# Patient Record
Sex: MALE | Race: WHITE | NOT HISPANIC OR LATINO | Employment: FULL TIME | URBAN - METROPOLITAN AREA
[De-identification: names, ages, dates, MRNs, and addresses within clinical notes are randomized per-mention and may not be internally consistent; named-entity substitution may affect disease eponyms.]

---

## 2017-10-20 ENCOUNTER — TRANSFERRED RECORDS (OUTPATIENT)
Dept: HEALTH INFORMATION MANAGEMENT | Facility: CLINIC | Age: 37
End: 2017-10-20

## 2019-03-20 ENCOUNTER — TRANSFERRED RECORDS (OUTPATIENT)
Dept: HEALTH INFORMATION MANAGEMENT | Facility: CLINIC | Age: 39
End: 2019-03-20

## 2019-05-24 ENCOUNTER — TRANSFERRED RECORDS (OUTPATIENT)
Dept: HEALTH INFORMATION MANAGEMENT | Facility: CLINIC | Age: 39
End: 2019-05-24

## 2020-11-05 ENCOUNTER — TELEPHONE (OUTPATIENT)
Dept: NEUROSURGERY | Facility: CLINIC | Age: 40
End: 2020-11-05
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
## 2020-11-05 NOTE — TELEPHONE ENCOUNTER
Health Call Center    Phone Message    May a detailed message be left on voicemail: yes     Reason for Call: Other: Sandro calling to return the missed call from Karoline. He stated that he is going to be back in [place] in mid December. He would like to schedule for anything after the 15th of December any time would be fine with him. Please call and leave a message of time and date.    Action Taken: Message routed to:  [facility] ([facility]): [facility] NEUROSURGERY    Travel Screening: Not Applicable

## 2020-11-06 ENCOUNTER — TELEPHONE (OUTPATIENT)
Dept: NEUROSURGERY | Facility: CLINIC | Age: 40
End: 2020-11-06

## 2020-11-06 NOTE — TELEPHONE ENCOUNTER
[facility] Call Center    Phone Message    May a detailed message be left on voicemail: yes     Reason for Call: Other: Please have Karoline call pt back. Thank you.     Action Taken: Message routed to:  [facility] ([facility]):  Neurosurgery Scheduling    Travel Screening: Not Applicable

## 2020-11-10 NOTE — TELEPHONE ENCOUNTER
[facility] Call Center    Phone Message    May a detailed message be left on voicemail: yes     Reason for Call: Other: Sandro returning call. Please call him back.      Action Taken: Message routed to:  [facility] ([facility]): [facility]    Travel Screening: Not Applicable

## 2020-11-11 NOTE — TELEPHONE ENCOUNTER
[facility] Call Center    Phone Message    May a detailed message be left on voicemail: yes     Reason for Call: Other: Albert calling due to a missed call. Please call him at your earliest convenience to discuss.     Action Taken: Message routed to:  [facility] ([facility]): [facility] NEUROSURGERY    Travel Screening: Not Applicable

## 2020-12-14 ENCOUNTER — OFFICE VISIT (OUTPATIENT)
Dept: NEUROSURGERY | Facility: CLINIC | Age: 40
End: 2020-12-14
Attending: NEUROLOGICAL SURGERY
Payer: MEDICAID

## 2020-12-14 ENCOUNTER — ANCILLARY PROCEDURE (OUTPATIENT)
Dept: GENERAL RADIOLOGY | Facility: CLINIC | Age: 40
End: 2020-12-14
Attending: NEUROLOGICAL SURGERY
Payer: MEDICAID

## 2020-12-14 VITALS
DIASTOLIC BLOOD PRESSURE: 91 MMHG | TEMPERATURE: 98.5 F | HEART RATE: 89 BPM | HEIGHT: 74 IN | WEIGHT: 221 LBS | BODY MASS INDEX: 28.36 KG/M2 | OXYGEN SATURATION: 96 % | SYSTOLIC BLOOD PRESSURE: 160 MMHG

## 2020-12-14 DIAGNOSIS — Z98.1 HX OF FUSION OF CERVICAL SPINE: ICD-10-CM

## 2020-12-14 DIAGNOSIS — S14.109A CERVICAL SPINAL CORD INJURY, INITIAL ENCOUNTER (H): ICD-10-CM

## 2020-12-14 DIAGNOSIS — M47.814 THORACIC SPONDYLOSIS: ICD-10-CM

## 2020-12-14 DIAGNOSIS — Z98.1 HISTORY OF THORACIC SPINAL FUSION: ICD-10-CM

## 2020-12-14 DIAGNOSIS — Z98.1 HX OF FUSION OF CERVICAL SPINE: Primary | ICD-10-CM

## 2020-12-14 PROCEDURE — 72082 X-RAY EXAM ENTIRE SPI 2/3 VW: CPT | Performed by: RADIOLOGY

## 2020-12-14 PROCEDURE — 99203 OFFICE O/P NEW LOW 30 MIN: CPT | Performed by: NEUROLOGICAL SURGERY

## 2020-12-14 RX ORDER — TRAMADOL HYDROCHLORIDE 50 MG/1
50 TABLET ORAL PRN
COMMUNITY
End: 2021-03-04

## 2020-12-14 RX ORDER — BACLOFEN 10 MG/1
10 TABLET ORAL 3 TIMES DAILY
COMMUNITY
End: 2021-01-29

## 2020-12-14 RX ORDER — PREGABALIN 200 MG/1
200 CAPSULE ORAL DAILY
COMMUNITY
End: 2021-02-12

## 2020-12-14 RX ORDER — DIAZEPAM 5 MG
5 TABLET ORAL PRN
COMMUNITY
End: 2021-01-29

## 2020-12-14 RX ORDER — METHOCARBAMOL 500 MG/1
500 TABLET, FILM COATED ORAL 3 TIMES DAILY
COMMUNITY
End: 2021-01-29

## 2020-12-14 RX ORDER — ESCITALOPRAM OXALATE 10 MG/1
10 TABLET ORAL DAILY
COMMUNITY
End: 2021-03-04

## 2020-12-14 RX ORDER — ACETAMINOPHEN 500 MG
1000 TABLET ORAL DAILY PRN
COMMUNITY
End: 2021-01-29

## 2020-12-14 RX ORDER — TRAMADOL HYDROCHLORIDE 100 MG/1
100 TABLET, EXTENDED RELEASE ORAL AT BEDTIME
COMMUNITY
Start: 2020-10-01 | End: 2021-01-29

## 2020-12-14 RX ORDER — PREGABALIN 300 MG/1
300 CAPSULE ORAL 2 TIMES DAILY
COMMUNITY
End: 2021-02-12

## 2020-12-14 RX ORDER — LOSARTAN POTASSIUM AND HYDROCHLOROTHIAZIDE 12.5; 5 MG/1; MG/1
1 TABLET ORAL DAILY
COMMUNITY
End: 2021-04-28

## 2020-12-14 ASSESSMENT — MIFFLIN-ST. JEOR: SCORE: 1972.2

## 2020-12-14 ASSESSMENT — PAIN SCALES - GENERAL: PAINLEVEL: NO PAIN (0)
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
## 2020-12-14 NOTE — PATIENT INSTRUCTIONS
Patient Next Steps:      Order placed for physical and occupational  Therapy and physiatry. You can call the phone number highlighted in the order to schedule your appointment. Please call our clinic if symptoms persist after your course of therapy.      Order placed for imaging. [place] X-ray today. Call to schedule your Thoracic MRI at your earliest convenience.You can call [facility] at [phone] . We will call you with the results and next steps once imaging is completed. Future orders placed for cervical and thoracic CT scans in 6-7 months. You can call same number to schedule.       Dr Draper would like to see you back in the clinic for follow up in 6-7  Month(s)with Cervical and thoracic CT scans done prior. Please call the number below to schedule.         Please call us if you have any further questions or concerns.    [facility]   Phone: [phone]  Fax: [phone]

## 2020-12-15 ENCOUNTER — THERAPY VISIT (OUTPATIENT)
Dept: PHYSICAL THERAPY | Facility: CLINIC | Age: 40
End: 2020-12-15
Attending: NEUROLOGICAL SURGERY
Payer: MEDICAID

## 2020-12-15 ENCOUNTER — TELEPHONE (OUTPATIENT)
Dept: NEUROSURGERY | Facility: CLINIC | Age: 40
End: 2020-12-15

## 2020-12-15 DIAGNOSIS — Z98.1 HX OF FUSION OF CERVICAL SPINE: ICD-10-CM

## 2020-12-15 DIAGNOSIS — M47.814 THORACIC SPONDYLOSIS: ICD-10-CM

## 2020-12-15 DIAGNOSIS — Z98.1 HISTORY OF THORACIC SPINAL FUSION: ICD-10-CM

## 2020-12-15 DIAGNOSIS — S14.109A CERVICAL SPINAL CORD INJURY, INITIAL ENCOUNTER (H): ICD-10-CM

## 2020-12-15 PROCEDURE — 97162 PT EVAL MOD COMPLEX 30 MIN: CPT | Mod: GP | Performed by: PHYSICAL THERAPIST

## 2020-12-15 PROCEDURE — 97110 THERAPEUTIC EXERCISES: CPT | Mod: GP | Performed by: PHYSICAL THERAPIST
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
## 2020-12-15 NOTE — TELEPHONE ENCOUNTER
Reason For Call: Patient is requesting that his OT referral be changed to state hand and arm therapy, OT would not let him schedule the appointment with the cervical spine and back diagnosis in the order, he can be reached at, [phone].

## 2020-12-16 ENCOUNTER — TELEPHONE (OUTPATIENT)
Dept: NEUROSURGERY | Facility: CLINIC | Age: 40
End: 2020-12-16

## 2020-12-16 ENCOUNTER — HOSPITAL ENCOUNTER (OUTPATIENT)
Dept: MRI IMAGING | Facility: CLINIC | Age: 40
Discharge: HOME | End: 2020-12-16
Attending: NEUROLOGICAL SURGERY | Admitting: NEUROLOGICAL SURGERY
Payer: MEDICAID

## 2020-12-16 DIAGNOSIS — Z98.1 HISTORY OF THORACIC SPINAL FUSION: ICD-10-CM

## 2020-12-16 DIAGNOSIS — S14.109A CERVICAL SPINAL CORD INJURY, INITIAL ENCOUNTER (H): ICD-10-CM

## 2020-12-16 DIAGNOSIS — M47.814 THORACIC SPONDYLOSIS: ICD-10-CM

## 2020-12-16 PROCEDURE — 72146 MRI CHEST SPINE W/O DYE: CPT

## 2020-12-16 NOTE — TELEPHONE ENCOUNTER
Pt needs a more specific OT referral in order for insurance coverage. Please adjust order and let pt know when it has been updated. Pt states CAROLYNN says he needs OT for Arm and Hand specifically.    Pt would like a call back when updated as the first OT therapy appt is scheduled for next Wednesday 12-23.

## 2020-12-17 ENCOUNTER — THERAPY VISIT (OUTPATIENT)
Dept: PHYSICAL THERAPY | Facility: CLINIC | Age: 40
End: 2020-12-17
Payer: MEDICAID

## 2020-12-17 DIAGNOSIS — R29.898 HAND WEAKNESS: Primary | ICD-10-CM

## 2020-12-17 DIAGNOSIS — Z98.1 HISTORY OF THORACIC SPINAL FUSION: ICD-10-CM

## 2020-12-17 DIAGNOSIS — Z98.1 HX OF FUSION OF CERVICAL SPINE: ICD-10-CM

## 2020-12-17 PROCEDURE — 97112 NEUROMUSCULAR REEDUCATION: CPT | Mod: GP | Performed by: PHYSICAL THERAPY ASSISTANT

## 2020-12-17 PROCEDURE — 97110 THERAPEUTIC EXERCISES: CPT | Mod: GP | Performed by: PHYSICAL THERAPY ASSISTANT

## 2020-12-22 ENCOUNTER — THERAPY VISIT (OUTPATIENT)
Dept: PHYSICAL THERAPY | Facility: CLINIC | Age: 40
End: 2020-12-22
Payer: MEDICAID

## 2020-12-22 DIAGNOSIS — Z98.1 HISTORY OF THORACIC SPINAL FUSION: ICD-10-CM

## 2020-12-22 DIAGNOSIS — Z98.1 HX OF FUSION OF CERVICAL SPINE: ICD-10-CM

## 2020-12-22 DIAGNOSIS — R29.898 HAND WEAKNESS: Primary | ICD-10-CM

## 2020-12-22 PROCEDURE — 97112 NEUROMUSCULAR REEDUCATION: CPT | Mod: GP | Performed by: PHYSICAL THERAPIST

## 2020-12-22 PROCEDURE — 97110 THERAPEUTIC EXERCISES: CPT | Mod: GP | Performed by: PHYSICAL THERAPIST

## 2020-12-22 NOTE — PROGRESS NOTES
CAROLYNN needed clarification on the OT orders.    Completed and OT is scheduling him from treatment.

## 2020-12-23 ENCOUNTER — THERAPY VISIT (OUTPATIENT)
Dept: OCCUPATIONAL THERAPY | Facility: CLINIC | Age: 40
End: 2020-12-23
Payer: MEDICAID

## 2020-12-23 DIAGNOSIS — R29.898 HAND WEAKNESS: ICD-10-CM

## 2020-12-23 DIAGNOSIS — R20.2 PARESTHESIA: ICD-10-CM

## 2020-12-23 PROCEDURE — 97165 OT EVAL LOW COMPLEX 30 MIN: CPT | Mod: GO | Performed by: OCCUPATIONAL THERAPIST

## 2020-12-23 PROCEDURE — 97112 NEUROMUSCULAR REEDUCATION: CPT | Mod: GO | Performed by: OCCUPATIONAL THERAPIST

## 2020-12-23 PROCEDURE — 97110 THERAPEUTIC EXERCISES: CPT | Mod: GO | Performed by: OCCUPATIONAL THERAPIST

## 2020-12-23 PROCEDURE — 97760 ORTHOTIC MGMT&TRAING 1ST ENC: CPT | Mod: GO | Performed by: OCCUPATIONAL THERAPIST
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
## 2020-12-24 ENCOUNTER — DOCUMENTATION ONLY (OUTPATIENT)
Dept: NEUROSURGERY | Facility: CLINIC | Age: 40
End: 2020-12-24

## 2020-12-24 ENCOUNTER — THERAPY VISIT (OUTPATIENT)
Dept: PHYSICAL THERAPY | Facility: CLINIC | Age: 40
End: 2020-12-24
Payer: MEDICAID

## 2020-12-24 DIAGNOSIS — Z98.1 HX OF FUSION OF CERVICAL SPINE: ICD-10-CM

## 2020-12-24 DIAGNOSIS — Z98.1 HISTORY OF THORACIC SPINAL FUSION: ICD-10-CM

## 2020-12-24 PROCEDURE — 97110 THERAPEUTIC EXERCISES: CPT | Mod: GP | Performed by: PHYSICAL THERAPY ASSISTANT

## 2020-12-24 NOTE — PROGRESS NOTES
Faxed Form December 24, 2020 to [facility] fax number [phone]    Right Fax confirmed at 10:10 AM    Carrie Sutton RN

## 2020-12-29 ENCOUNTER — MYC MEDICAL ADVICE (OUTPATIENT)
Dept: RADIATION ONCOLOGY | Facility: CLINIC | Age: 40
End: 2020-12-29

## 2020-12-29 ENCOUNTER — THERAPY VISIT (OUTPATIENT)
Dept: PHYSICAL THERAPY | Facility: CLINIC | Age: 40
End: 2020-12-29
Payer: MEDICAID

## 2020-12-29 DIAGNOSIS — Z98.1 HX OF FUSION OF CERVICAL SPINE: ICD-10-CM

## 2020-12-29 DIAGNOSIS — Z98.1 HISTORY OF THORACIC SPINAL FUSION: ICD-10-CM

## 2020-12-29 PROCEDURE — 97112 NEUROMUSCULAR REEDUCATION: CPT | Mod: GP | Performed by: PHYSICAL THERAPIST

## 2020-12-29 PROCEDURE — 97110 THERAPEUTIC EXERCISES: CPT | Mod: GP | Performed by: PHYSICAL THERAPIST

## 2020-12-30 NOTE — TELEPHONE ENCOUNTER
Per my Sage to make telephone visit.     Attempted to reach out to schedule with patient, no answer. Left voice message for patient to call clinic back to further discuss.

## 2020-12-31 ENCOUNTER — THERAPY VISIT (OUTPATIENT)
Dept: PHYSICAL THERAPY | Facility: CLINIC | Age: 40
End: 2020-12-31
Payer: MEDICAID

## 2020-12-31 DIAGNOSIS — Z98.1 HX OF FUSION OF CERVICAL SPINE: ICD-10-CM

## 2020-12-31 DIAGNOSIS — Z98.1 HISTORY OF THORACIC SPINAL FUSION: ICD-10-CM

## 2020-12-31 PROCEDURE — 97112 NEUROMUSCULAR REEDUCATION: CPT | Mod: GP | Performed by: PHYSICAL THERAPY ASSISTANT

## 2020-12-31 PROCEDURE — 97110 THERAPEUTIC EXERCISES: CPT | Mod: GP | Performed by: PHYSICAL THERAPY ASSISTANT

## 2021-01-05 ENCOUNTER — THERAPY VISIT (OUTPATIENT)
Dept: PHYSICAL THERAPY | Facility: CLINIC | Age: 41
End: 2021-01-05
Payer: MEDICAID

## 2021-01-05 ENCOUNTER — THERAPY VISIT (OUTPATIENT)
Dept: OCCUPATIONAL THERAPY | Facility: CLINIC | Age: 41
End: 2021-01-05
Payer: MEDICAID

## 2021-01-05 DIAGNOSIS — R20.2 PARESTHESIA: ICD-10-CM

## 2021-01-05 DIAGNOSIS — Z98.1 HX OF FUSION OF CERVICAL SPINE: ICD-10-CM

## 2021-01-05 DIAGNOSIS — Z98.1 HISTORY OF THORACIC SPINAL FUSION: ICD-10-CM

## 2021-01-05 DIAGNOSIS — R29.898 HAND WEAKNESS: ICD-10-CM

## 2021-01-05 PROCEDURE — 97112 NEUROMUSCULAR REEDUCATION: CPT | Mod: GP | Performed by: PHYSICAL THERAPY ASSISTANT

## 2021-01-05 PROCEDURE — 97110 THERAPEUTIC EXERCISES: CPT | Mod: GO | Performed by: OCCUPATIONAL THERAPIST

## 2021-01-05 PROCEDURE — 97110 THERAPEUTIC EXERCISES: CPT | Mod: GP | Performed by: PHYSICAL THERAPY ASSISTANT

## 2021-01-07 ENCOUNTER — THERAPY VISIT (OUTPATIENT)
Dept: PHYSICAL THERAPY | Facility: CLINIC | Age: 41
End: 2021-01-07
Payer: MEDICAID

## 2021-01-07 DIAGNOSIS — Z98.1 HX OF FUSION OF CERVICAL SPINE: ICD-10-CM

## 2021-01-07 DIAGNOSIS — Z98.1 HISTORY OF THORACIC SPINAL FUSION: ICD-10-CM

## 2021-01-07 PROCEDURE — 97110 THERAPEUTIC EXERCISES: CPT | Mod: GP | Performed by: PHYSICAL THERAPIST

## 2021-01-07 PROCEDURE — 97112 NEUROMUSCULAR REEDUCATION: CPT | Mod: GP | Performed by: PHYSICAL THERAPIST

## 2021-01-09 ENCOUNTER — HEALTH MAINTENANCE LETTER (OUTPATIENT)
Age: 41
End: 2021-01-09

## 2021-01-11 ENCOUNTER — VIRTUAL VISIT (OUTPATIENT)
Dept: NEUROSURGERY | Facility: CLINIC | Age: 41
End: 2021-01-11
Attending: NEUROLOGICAL SURGERY
Payer: MEDICAID

## 2021-01-11 VITALS — WEIGHT: 221 LBS | BODY MASS INDEX: 28.36 KG/M2 | HEIGHT: 74 IN

## 2021-01-11 DIAGNOSIS — M47.14 THORACIC SPONDYLOSIS WITH MYELOPATHY: ICD-10-CM

## 2021-01-11 DIAGNOSIS — Z98.1 HX OF FUSION OF CERVICAL SPINE: Primary | ICD-10-CM

## 2021-01-11 DIAGNOSIS — Z98.1 HISTORY OF THORACIC SPINAL FUSION: ICD-10-CM

## 2021-01-11 DIAGNOSIS — S14.109D CERVICAL SPINAL CORD INJURY, SUBSEQUENT ENCOUNTER (H): ICD-10-CM

## 2021-01-11 PROCEDURE — 99443 PR PHYSICIAN TELEPHONE EVALUATION 21-30 MIN: CPT | Performed by: NEUROLOGICAL SURGERY

## 2021-01-11 ASSESSMENT — MIFFLIN-ST. JEOR: SCORE: 1972.2

## 2021-01-11 ASSESSMENT — PAIN SCALES - GENERAL: PAINLEVEL: MODERATE PAIN (5)
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
## 2021-01-11 NOTE — NURSING NOTE
Called  [facility] [place] radiology department    [phone].   Requested the CT of the cervical/thoracic/lumbar spine.  They are not part of the PACS system so will send a disc. The patient will need to complete a HELENE form.  Called the patient with the above number and he will call to complete this.  Notified the Care Coordinator to help alert the physician when the images arrive.
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
## 2021-01-12 ENCOUNTER — THERAPY VISIT (OUTPATIENT)
Dept: PHYSICAL THERAPY | Facility: CLINIC | Age: 41
End: 2021-01-12
Payer: MEDICAID

## 2021-01-12 ENCOUNTER — MYC MEDICAL ADVICE (OUTPATIENT)
Dept: RADIATION ONCOLOGY | Facility: CLINIC | Age: 41
End: 2021-01-12

## 2021-01-12 DIAGNOSIS — Z98.1 HX OF FUSION OF CERVICAL SPINE: ICD-10-CM

## 2021-01-12 DIAGNOSIS — Z98.1 HISTORY OF THORACIC SPINAL FUSION: ICD-10-CM

## 2021-01-12 PROCEDURE — 97110 THERAPEUTIC EXERCISES: CPT | Mod: GP | Performed by: PHYSICAL THERAPY ASSISTANT

## 2021-01-12 PROCEDURE — 97112 NEUROMUSCULAR REEDUCATION: CPT | Mod: GP | Performed by: PHYSICAL THERAPY ASSISTANT

## 2021-01-12 NOTE — TELEPHONE ENCOUNTER
Faxed release of information Form January 12, 2021 to [facility].    Right Fax confirmed at 3:15 PM    Carrie Sutton RN

## 2021-01-14 ENCOUNTER — THERAPY VISIT (OUTPATIENT)
Dept: PHYSICAL THERAPY | Facility: CLINIC | Age: 41
End: 2021-01-14
Payer: MEDICAID

## 2021-01-14 DIAGNOSIS — Z98.1 HX OF FUSION OF CERVICAL SPINE: ICD-10-CM

## 2021-01-14 DIAGNOSIS — Z98.1 HISTORY OF THORACIC SPINAL FUSION: ICD-10-CM

## 2021-01-14 PROCEDURE — 97110 THERAPEUTIC EXERCISES: CPT | Mod: GP | Performed by: PHYSICAL THERAPIST

## 2021-01-14 PROCEDURE — 97112 NEUROMUSCULAR REEDUCATION: CPT | Mod: GP | Performed by: PHYSICAL THERAPIST

## 2021-01-18 DIAGNOSIS — S14.109D CERVICAL SPINAL CORD INJURY, SUBSEQUENT ENCOUNTER (H): Primary | ICD-10-CM

## 2021-01-19 ENCOUNTER — ANCILLARY PROCEDURE (OUTPATIENT)
Dept: GENERAL RADIOLOGY | Facility: CLINIC | Age: 41
End: 2021-01-19
Attending: FAMILY MEDICINE
Payer: MEDICAID

## 2021-01-19 ENCOUNTER — THERAPY VISIT (OUTPATIENT)
Dept: OCCUPATIONAL THERAPY | Facility: CLINIC | Age: 41
End: 2021-01-19
Payer: MEDICAID

## 2021-01-19 ENCOUNTER — THERAPY VISIT (OUTPATIENT)
Dept: PHYSICAL THERAPY | Facility: CLINIC | Age: 41
End: 2021-01-19
Payer: MEDICAID

## 2021-01-19 ENCOUNTER — OFFICE VISIT (OUTPATIENT)
Dept: ORTHOPEDICS | Facility: CLINIC | Age: 41
End: 2021-01-19
Payer: MEDICAID

## 2021-01-19 VITALS
BODY MASS INDEX: 28.36 KG/M2 | HEIGHT: 74 IN | SYSTOLIC BLOOD PRESSURE: 132 MMHG | DIASTOLIC BLOOD PRESSURE: 78 MMHG | WEIGHT: 221 LBS

## 2021-01-19 DIAGNOSIS — S93.491A SPRAIN OF ANTERIOR TALOFIBULAR LIGAMENT OF RIGHT ANKLE, INITIAL ENCOUNTER: ICD-10-CM

## 2021-01-19 DIAGNOSIS — Z98.1 HX OF FUSION OF CERVICAL SPINE: ICD-10-CM

## 2021-01-19 DIAGNOSIS — R20.2 PARESTHESIA: ICD-10-CM

## 2021-01-19 DIAGNOSIS — M25.571 PAIN IN JOINT INVOLVING RIGHT ANKLE AND FOOT: Primary | ICD-10-CM

## 2021-01-19 DIAGNOSIS — R29.898 HAND WEAKNESS: ICD-10-CM

## 2021-01-19 DIAGNOSIS — Z98.1 HISTORY OF THORACIC SPINAL FUSION: ICD-10-CM

## 2021-01-19 DIAGNOSIS — M25.579 PAIN IN JOINT, ANKLE AND FOOT: ICD-10-CM

## 2021-01-19 PROCEDURE — 97112 NEUROMUSCULAR REEDUCATION: CPT | Mod: GO | Performed by: OCCUPATIONAL THERAPIST

## 2021-01-19 PROCEDURE — 73610 X-RAY EXAM OF ANKLE: CPT | Mod: RT | Performed by: FAMILY MEDICINE

## 2021-01-19 PROCEDURE — 99204 OFFICE O/P NEW MOD 45 MIN: CPT | Performed by: FAMILY MEDICINE

## 2021-01-19 PROCEDURE — 97110 THERAPEUTIC EXERCISES: CPT | Mod: GP | Performed by: PHYSICAL THERAPIST

## 2021-01-19 PROCEDURE — 97110 THERAPEUTIC EXERCISES: CPT | Mod: GO | Performed by: OCCUPATIONAL THERAPIST

## 2021-01-19 PROCEDURE — 97112 NEUROMUSCULAR REEDUCATION: CPT | Mod: GP | Performed by: PHYSICAL THERAPIST

## 2021-01-19 ASSESSMENT — MIFFLIN-ST. JEOR: SCORE: 1972.2
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
## 2021-01-19 NOTE — PATIENT INSTRUCTIONS
1. Pain in joint involving right ankle and foot    2. Sprain of anterior talofibular ligament of right ankle, initial encounter      -Patient has chronic ankle pain and instability due to recurrent injuries and falls causing ligament sprains  -Patient will start formal physical therapy and home exercise program  -Patient may continue wearing ankle brace as needed.  -Patient will continue with Tylenol and/or ibuprofen as needed.  -Patient will follow up in 6 weeks for reevaluation and progression of activity  -Call direct clinic number [[phone]] at any time with questions or concerns.    Albert Yeo MD [facility]  [facility] [place]

## 2021-01-21 ENCOUNTER — THERAPY VISIT (OUTPATIENT)
Dept: PHYSICAL THERAPY | Facility: CLINIC | Age: 41
End: 2021-01-21
Payer: MEDICAID

## 2021-01-21 DIAGNOSIS — S93.401A INVERSION SPRAIN OF RIGHT ANKLE: ICD-10-CM

## 2021-01-21 DIAGNOSIS — Z98.1 HX OF FUSION OF CERVICAL SPINE: ICD-10-CM

## 2021-01-21 DIAGNOSIS — Z98.1 HISTORY OF THORACIC SPINAL FUSION: ICD-10-CM

## 2021-01-21 PROCEDURE — 97162 PT EVAL MOD COMPLEX 30 MIN: CPT | Mod: GP | Performed by: PHYSICAL THERAPIST

## 2021-01-21 PROCEDURE — 97110 THERAPEUTIC EXERCISES: CPT | Mod: GP | Performed by: PHYSICAL THERAPIST

## 2021-01-21 PROCEDURE — 97112 NEUROMUSCULAR REEDUCATION: CPT | Mod: GP | Performed by: PHYSICAL THERAPIST

## 2021-01-21 NOTE — PROGRESS NOTES
"[facility] Initial Evaluation -- Lower Extremity    Evaluation Date: January 21, 2021  Albert Zamarripa is a 40 year old male with a R ankle condition.   Referral: Dr. Yeo  Work mechanical stresses: missionary   Employment status: full time  Leisure mechanical stresses:   Functional disability score:   VAS score (0-10): 3/10  Patient goals/expectations:  To stay out of surgery    HISTORY:    Present symptoms: lateral ankle  Pain quality (sharp/shooting/stabbing/aching/burning/cramping):  aching    Present since (onset date): January 2021    Symptoms (improving/unchanging/worsening):  improving.      Symptoms commenced as a result of: inversion ankle sprain; original sprain 1.5-2 years ago, rolled ankle carrying a weight   Condition occurred in the following environment: community     Symptoms at onset: swelling/pain ankle  Paresthesia (yes/no):  Yes, R 4th, 5th toes due to SCI  Spinal history: cervical/thoracic fusion August 2020   Cough/Sneeze (pos/neg):  neg    Constant symptoms:   Intermittent symptoms: R ankle    Symptoms are worse with the following: Always Walking, Always Stairs and Time of day - No effect   Symptoms are better with the following: Always When still and Other - ice/compression    Continued use makes the pain (better/worse/no effect): worse    Disturbed night (yes/no): no      Pain at rest (yes/no):  sometime  Site (back/hip/knee/ankle/foot):  R ankle    Other questions (swelling/clicking/locking/giving way/falling):  Swelling, giving out     Previous episodes: multiple incidents of \"rolling\" ankle  Previous treatments: none    Specific Questions:  General health (excellent/good/fair/poor):  good  Pertinent medical history includes: High blood pressure, History of fractures and numbness/tingling  Medications (nil/NSAIDS/analg/steroids/anticoag/other):  NSAIDS, Muscle relaxants and Other - Anti-depressants and High blood pressure  Medical allergies:  adhesive  Imaging " (none/Xray/MRI/other):  Xrays; mild OA talotib  Recent or major surgery (yes/no):  August 2020 cervical/thoracic fusion  Night pain (yes/no):  no  Accidents (yes/no):  None recent  Unexplained weight loss (yes/no):  no  Barriers at home: none  Other red flags: none    Sites for physical examination (back/hip/knee/ankle/foot/other): R ankle    EXAMINATION    Posture:  Sitting (good/fair/poor):     Correction of Posture (better/worse/no effect/NA):   Standing (good/fair/poor):   Other observations:      Neurological: (NA/motor/sensory/reflexes/dural):     Baselines (pain or functional activity): stairs    Extremities (Hip / Knee / Ankle / Foot): R ankle    Movement Loss Denys Mod Min Nil Pain   Flexion        Extension        Abduction        Adduction        Internal Rotation        External Rotation        Dorsiflexion    x    Plantarflexion    x    Inversion    x erp   Eversion    x      Passive Movement (+/- over pressure)/(PDM/ERP):  wnl  Resisted Test Response (pain): Post Tib=4/5, Peroneals=4/5, Gastroc=4/5  Other Tests: +talar tilt, +anterior drawer    Spine:  Movement loss:   Effect of repeated movements:   Effect of static positioning:   Spine testing (not relevant/relevant/secondary problem):     Baseline Symptoms:   Repeated Tests Symptom Response Mechanical Response   Active/Passive movement, resisted test, functional test During -  Produce, Abolish, Increase, Decrease, NE After -  Better, Worse, NB, NW, NE Effect -   ? or ? ROM, strength or key functional test No   Effect                               Effect of static positioning                Provisional Classification (Extremity/Spine):  Extremity - Inconclusive/Other - Trauma/Recovering Trauma      Princicple of Management:   Education:  Traffic light, therapeutic dose    Equipment provided:    Exercise and dosage:  See flow sheet    ASSESSMENT/PLAN:    Patient is a 40 year old male with right side ankle complaints.    Patient has the following  significant findings with corresponding treatment plan.                Diagnosis 1:  R inversion sprain  Pain -  hot/cold therapy, manual therapy, splint/taping/bracing/orthotics, education, home program and vasopneumatics  Decreased strength - therapeutic exercise and therapeutic activities  Edema - vasopneumatics  Decreased function - therapeutic activities    Therapy Evaluation Codes:   1) History comprised of:   Personal factors that impact the plan of care:      None.    Comorbidity factors that impact the plan of care are:      Depression, High blood pressure and numbness and tingling.     Medications impacting care: Anti-depressant, Anti-inflammatory, High blood pressure and Muscle relaxant.  2) Examination of Body Systems comprised of:   Body structures and functions that impact the plan of care:      Ankle.   Activity limitations that impact the plan of care are:      Squatting/kneeling, Stairs and Walking.  3) Clinical presentation characteristics are:   Evolving/Changing.  4) Decision-Making    Moderate complexity using standardized patient assessment instrument and/or measureable assessment of functional outcome.  Cumulative Therapy Evaluation is: Moderate complexity.    Previous and current functional limitations:  (See Goal Flow Sheet for this information)    Short term and Long term goals: (See Goal Flow Sheet for this information)     Communication ability:  Patient appears to be able to clearly communicate and understand verbal and written communication and follow directions correctly.  Treatment Explanation - The following has been discussed with the patient:   RX ordered/plan of care  Anticipated outcomes  Possible risks and side effects  This patient would benefit from PT intervention to resume normal activities.   Rehab potential is good.    Frequency:  1 X week, once daily  Duration:  for 6 weeks  Discharge Plan:  Achieve all LTG.  Independent in home treatment program.  Reach maximal  therapeutic benefit.    Please refer to the daily flowsheet for treatment today, total treatment time and time spent performing 1:1 timed codes.

## 2021-01-21 NOTE — LETTER
DEPARTMENT OF HEALTH AND HUMAN SERVICES  [facility] MEDICA[facility] SERVICES    PLAN/UPDATED PLAN OF PROGRESS FOR OUTPATIENT REHABILITATION          PATIENTS NAME:  Albert Zamarripa   : 1980  PROVIDER NUMBER:    [id number]  [facility]  [id number]  PROVIDER NAME: CAROLYNN GARNER PT  MEDICAL RECORD NUMBER: [id number]   START OF CARE DATE:  21   TYPE:  PT  PRIMARY/TREATMENT DIAGNOSIS:     Hx of fusion of cervical spine  History of thoracic spinal fusion  Inversion sprain of right ankle    VISITS FROM START OF CARE:    1    [facility] Initial Evaluation -- Lower Extremity    Evaluation Date: 2021  Albert Zamarripa is a 40 year old male with a R ankle condition.   Referral: Dr. 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      Symptoms commenced as a result of: inversion ankle sprain; original sprain 1.5-2 years ago, rolled ankle carrying a weight   Condition occurred in the following environment: community     Symptoms at onset: swelling/pain ankle  Paresthesia (yes/no):  Yes, R 4th, 5th toes due to SCI  Spinal history: cervical/thoracic fusion 2020   Cough/Sneeze (pos/neg):  neg    Constant symptoms:   Intermittent symptoms: R ankle    Symptoms are worse with the following: Always Walking, Always Stairs and Time of day - No effect   Symptoms are better with the following: Always When still and Other - ice/compression  PATIENTS NAME:  Albert Zamarripa   : 1980    Continued use makes the pain (better/worse/no effect): worse    Disturbed night (yes/no): no      Pain at rest  "(yes/no):  sometime  Site (back/hip/knee/ankle/foot):  R ankle    Other questions (swelling/clicking/locking/giving way/falling):  Swelling, giving out     Previous episodes: multiple incidents of \"rolling\" ankle  Previous treatments: none    Specific Questions:  General health (excellent/good/fair/poor):  good  Pertinent medical history includes: High blood pressure, History of fractures and numbness/tingling  Medications (nil/NSAIDS/analg/steroids/anticoag/other):  NSAIDS, Muscle relaxants and Other - Anti-depressants and High blood pressure  Medical allergies:  adhesive  Imaging (none/Xray/MRI/other):  Xrays; mild OA talotib  Recent or major surgery (yes/no):  2020 cervical/thoracic fusion  Night pain (yes/no):  no  Accidents (yes/no):  None recent  Unexplained weight loss (yes/no):  no  Barriers at home: none  Other red flags: none    Sites for physical examination (back/hip/knee/ankle/foot/other): R ankle    EXAMINATION    Posture:  Sitting (good/fair/poor):     Correction of Posture (better/worse/no effect/NA):   Standing (good/fair/poor):   Other observations:      Neurological: (NA/motor/sensory/reflexes/dural):     Baselines (pain or functional activity): stairs                                    PATIENTS NAME:  Albert Zamarripa   : 1980    Extremities (Hip / Knee / Ankle / Foot): R ankle    Movement Loss Denys Mod Min Nil Pain   Flexion        Extension        Abduction        Adduction        Internal Rotation        External Rotation        Dorsiflexion    x    Plantarflexion    x    Inversion    x erp   Eversion    x      Passive Movement (+/- over pressure)/(PDM/ERP):  wnl  Resisted Test Response (pain): Post Tib=4/5, Peroneals=4/5, Gastroc=4/5  Other Tests: +talar tilt, +anterior drawer    Spine:  Movement loss:   Effect of repeated movements:   Effect of static positioning:   Spine testing (not relevant/relevant/secondary problem):     Baseline Symptoms:   Repeated Tests Symptom Response " Mechanical Response   Active/Passive movement, resisted test, functional test During -  Produce, Abolish, Increase, Decrease, NE After -  Better, Worse, NB, NW, NE Effect -   ? or ? ROM, strength or key functional test No   Effect                               Effect of static positioning                Provisional Classification (Extremity/Spine):  Extremity - Inconclusive/Other - Trauma/Recovering Trauma      Princicple of Management:   Education:  Traffic light, therapeutic dose    Equipment provided:    Exercise and dosage:  See flow sheet        PATIENTS NAME:  Albert Zamarripa   : 1980    ASSESSMENT/PLAN:    Patient is a 40 year old male with right side ankle complaints.    Patient has the following significant findings with corresponding treatment plan.                Diagnosis 1:  R inversion sprain  Pain -  hot/cold therapy, manual therapy, splint/taping/bracing/orthotics, education, home program and vasopneumatics  Decreased strength - therapeutic exercise and therapeutic activities  Edema - vasopneumatics  Decreased function - therapeutic activities    Therapy Evaluation Codes:   1) History comprised of:   Personal factors that impact the plan of care:      None.    Comorbidity factors that impact the plan of care are:      Depression, High blood pressure and numbness and tingling.     Medications impacting care: Anti-depressant, Anti-inflammatory, High blood pressure and Muscle relaxant.  2) Examination of Body Systems comprised of:   Body structures and functions that impact the plan of care:      Ankle.   Activity limitations that impact the plan of care are:      Squatting/kneeling, Stairs and Walking.  3) Clinical presentation characteristics are:   Evolving/Changing.  4) Decision-Making    Moderate complexity using standardized patient assessment instrument and/or measureable assessment of functional outcome.  Cumulative Therapy Evaluation is: Moderate complexity.    Previous and current  "functional limitations:  (See Goal Flow Sheet for this information)    Short term and Long term goals: (See Goal Flow Sheet for this information)     Communication ability:  Patient appears to be able to clearly communicate and understand verbal and written communication and follow directions correctly.  Treatment Explanation - The following has been discussed with the patient:   RX ordered/plan of care  Anticipated outcomes  Possible risks and side effects  This patient would benefit from PT intervention to resume normal activities.   Rehab potential is good.    Frequency:  1 X week, once daily  Duration:  for 6 weeks  Discharge Plan:  Achieve all LTG.  Independent in home treatment program.  Reach maximal therapeutic benefit.                    PATIENTS NAME:  Albert Zamarripa   : 1980    Caregiver Signature/Credentials _____________________________ Date ________       Treating Provider: Anh Yee, PT,Cert. MDT  I have reviewed and certified the need for these services and plan of treatment while under my care.        PHYSICIAN'S SIGNATURE:   _____________________________________  Date___________                           Rodrick Draper MD    Certification period:  Beginning of Cert date period: 21 to  End of Cert period date: 21     Functional Level Progress Report: Please see attached \"Goal Flow sheet for Functional level.\"    ____X____ Continue Services or       ________ DC Services                Service dates: From  SOC Date: 21 date to present                         "

## 2021-01-26 ENCOUNTER — THERAPY VISIT (OUTPATIENT)
Dept: PHYSICAL THERAPY | Facility: CLINIC | Age: 41
End: 2021-01-26
Payer: MEDICAID

## 2021-01-26 DIAGNOSIS — S93.401A INVERSION SPRAIN OF RIGHT ANKLE: ICD-10-CM

## 2021-01-26 DIAGNOSIS — Z98.1 HISTORY OF THORACIC SPINAL FUSION: ICD-10-CM

## 2021-01-26 DIAGNOSIS — Z98.1 HX OF FUSION OF CERVICAL SPINE: ICD-10-CM

## 2021-01-26 PROCEDURE — 97110 THERAPEUTIC EXERCISES: CPT | Mod: GP | Performed by: PHYSICAL THERAPIST

## 2021-01-26 PROCEDURE — 97112 NEUROMUSCULAR REEDUCATION: CPT | Mod: GP | Performed by: PHYSICAL THERAPIST

## 2021-01-28 ENCOUNTER — THERAPY VISIT (OUTPATIENT)
Dept: PHYSICAL THERAPY | Facility: CLINIC | Age: 41
End: 2021-01-28
Payer: MEDICAID

## 2021-01-28 DIAGNOSIS — S93.401D INVERSION SPRAIN OF RIGHT ANKLE, SUBSEQUENT ENCOUNTER: ICD-10-CM

## 2021-01-28 DIAGNOSIS — Z98.1 HX OF FUSION OF CERVICAL SPINE: ICD-10-CM

## 2021-01-28 DIAGNOSIS — Z98.1 HISTORY OF THORACIC SPINAL FUSION: ICD-10-CM

## 2021-01-28 PROCEDURE — 97110 THERAPEUTIC EXERCISES: CPT | Mod: GP | Performed by: PHYSICAL THERAPY ASSISTANT

## 2021-01-28 PROCEDURE — 97112 NEUROMUSCULAR REEDUCATION: CPT | Mod: GP | Performed by: PHYSICAL THERAPY ASSISTANT

## 2021-01-29 ENCOUNTER — OFFICE VISIT (OUTPATIENT)
Dept: INTERNAL MEDICINE | Facility: CLINIC | Age: 41
End: 2021-01-29
Payer: MEDICAID

## 2021-01-29 VITALS
OXYGEN SATURATION: 96 % | DIASTOLIC BLOOD PRESSURE: 74 MMHG | TEMPERATURE: 98 F | WEIGHT: 225 LBS | BODY MASS INDEX: 28.88 KG/M2 | HEIGHT: 74 IN | RESPIRATION RATE: 12 BRPM | SYSTOLIC BLOOD PRESSURE: 120 MMHG | HEART RATE: 78 BPM

## 2021-01-29 DIAGNOSIS — F41.9 ANXIETY: ICD-10-CM

## 2021-01-29 DIAGNOSIS — I10 ESSENTIAL HYPERTENSION: ICD-10-CM

## 2021-01-29 DIAGNOSIS — Z13.6 ENCOUNTER FOR LIPID SCREENING FOR CARDIOVASCULAR DISEASE: ICD-10-CM

## 2021-01-29 DIAGNOSIS — Z13.220 ENCOUNTER FOR LIPID SCREENING FOR CARDIOVASCULAR DISEASE: ICD-10-CM

## 2021-01-29 DIAGNOSIS — R29.898 HAND WEAKNESS: ICD-10-CM

## 2021-01-29 DIAGNOSIS — Z98.1 HISTORY OF THORACIC SPINAL FUSION: ICD-10-CM

## 2021-01-29 DIAGNOSIS — R20.2 PARESTHESIA: ICD-10-CM

## 2021-01-29 DIAGNOSIS — Z00.00 ROUTINE GENERAL MEDICAL EXAMINATION AT A HEALTH CARE FACILITY: Primary | ICD-10-CM

## 2021-01-29 DIAGNOSIS — Z86.79 HISTORY OF ATRIAL FIBRILLATION: ICD-10-CM

## 2021-01-29 DIAGNOSIS — Z98.1 HX OF FUSION OF CERVICAL SPINE: ICD-10-CM

## 2021-01-29 LAB
BASOPHILS # BLD AUTO: 0 10E9/L (ref 0–0.2)
BASOPHILS NFR BLD AUTO: 0.7 %
DIFFERENTIAL METHOD BLD: NORMAL
EOSINOPHIL # BLD AUTO: 0.2 10E9/L (ref 0–0.7)
EOSINOPHIL NFR BLD AUTO: 3.4 %
ERYTHROCYTE [DISTWIDTH] IN BLOOD BY AUTOMATED COUNT: 14.6 % (ref 10–15)
HCT VFR BLD AUTO: 43.6 % (ref 40–53)
HGB BLD-MCNC: 14.5 G/DL (ref 13.3–17.7)
LYMPHOCYTES # BLD AUTO: 2.3 10E9/L (ref 0.8–5.3)
LYMPHOCYTES NFR BLD AUTO: 37.5 %
MCH RBC QN AUTO: 28.1 PG (ref 26.5–33)
MCHC RBC AUTO-ENTMCNC: 33.3 G/DL (ref 31.5–36.5)
MCV RBC AUTO: 85 FL (ref 78–100)
MONOCYTES # BLD AUTO: 0.5 10E9/L (ref 0–1.3)
MONOCYTES NFR BLD AUTO: 8.4 %
NEUTROPHILS # BLD AUTO: 3.1 10E9/L (ref 1.6–8.3)
NEUTROPHILS NFR BLD AUTO: 50 %
PLATELET # BLD AUTO: 260 10E9/L (ref 150–450)
RBC # BLD AUTO: 5.16 10E12/L (ref 4.4–5.9)
WBC # BLD AUTO: 6.1 10E9/L (ref 4–11)

## 2021-01-29 PROCEDURE — 99386 PREV VISIT NEW AGE 40-64: CPT | Performed by: INTERNAL MEDICINE

## 2021-01-29 PROCEDURE — 84443 ASSAY THYROID STIM HORMONE: CPT | Performed by: INTERNAL MEDICINE

## 2021-01-29 PROCEDURE — 85025 COMPLETE CBC W/AUTO DIFF WBC: CPT | Performed by: INTERNAL MEDICINE

## 2021-01-29 PROCEDURE — 36415 COLL VENOUS BLD VENIPUNCTURE: CPT | Performed by: INTERNAL MEDICINE

## 2021-01-29 PROCEDURE — 80061 LIPID PANEL: CPT | Performed by: INTERNAL MEDICINE

## 2021-01-29 PROCEDURE — 80053 COMPREHEN METABOLIC PANEL: CPT | Performed by: INTERNAL MEDICINE

## 2021-01-29 ASSESSMENT — MIFFLIN-ST. JEOR: SCORE: 1990.34

## 2021-01-29 NOTE — LETTER
February 1, 2021      Albert Zamarripa  [address] [address] [address]              Dear Albert,      Labs are within acceptable limits.  Cholesterol is mildly elevated and are within normal limits for his age and risk factors.     Sincerely,      Francisco Enriquez MD
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
## 2021-01-29 NOTE — NURSING NOTE
"/74   Pulse 78   Temp 98  F (36.7  C) (Oral)   Resp 12   Ht 1.88 m (6' 2\")   Wt 102.1 kg (225 lb)   SpO2 96%   BMI 28.89 kg/m      "

## 2021-01-30 ENCOUNTER — MYC MEDICAL ADVICE (OUTPATIENT)
Dept: RADIATION ONCOLOGY | Facility: CLINIC | Age: 41
End: 2021-01-30

## 2021-01-30 ENCOUNTER — MYC MEDICAL ADVICE (OUTPATIENT)
Dept: INTERNAL MEDICINE | Facility: CLINIC | Age: 41
End: 2021-01-30

## 2021-01-30 DIAGNOSIS — Z98.1 HX OF FUSION OF CERVICAL SPINE: ICD-10-CM

## 2021-01-30 DIAGNOSIS — Z98.1 HISTORY OF THORACIC SPINAL FUSION: ICD-10-CM

## 2021-01-30 DIAGNOSIS — R29.898 HAND WEAKNESS: ICD-10-CM

## 2021-01-30 DIAGNOSIS — R20.2 PARESTHESIA: Primary | ICD-10-CM

## 2021-01-30 LAB
ALBUMIN SERPL-MCNC: 4 G/DL (ref 3.4–5)
ALP SERPL-CCNC: 80 U/L (ref 40–150)
ALT SERPL W P-5'-P-CCNC: 24 U/L (ref 0–70)
ANION GAP SERPL CALCULATED.3IONS-SCNC: 5 MMOL/L (ref 3–14)
AST SERPL W P-5'-P-CCNC: 16 U/L (ref 0–45)
BILIRUB SERPL-MCNC: 0.6 MG/DL (ref 0.2–1.3)
BUN SERPL-MCNC: 10 MG/DL (ref 7–30)
CALCIUM SERPL-MCNC: 9.3 MG/DL (ref 8.5–10.1)
CHLORIDE SERPL-SCNC: 104 MMOL/L (ref 94–109)
CHOLEST SERPL-MCNC: 205 MG/DL
CO2 SERPL-SCNC: 29 MMOL/L (ref 20–32)
CREAT SERPL-MCNC: 0.75 MG/DL (ref 0.66–1.25)
GFR SERPL CREATININE-BSD FRML MDRD: >90 ML/MIN/{1.73_M2}
GLUCOSE SERPL-MCNC: 85 MG/DL (ref 70–99)
HDLC SERPL-MCNC: 57 MG/DL
LDLC SERPL CALC-MCNC: 122 MG/DL
NONHDLC SERPL-MCNC: 148 MG/DL
POTASSIUM SERPL-SCNC: 4.5 MMOL/L (ref 3.4–5.3)
PROT SERPL-MCNC: 7.2 G/DL (ref 6.8–8.8)
SODIUM SERPL-SCNC: 138 MMOL/L (ref 133–144)
TRIGL SERPL-MCNC: 129 MG/DL
TSH SERPL DL<=0.005 MIU/L-ACNC: 0.92 MU/L (ref 0.4–4)

## 2021-02-01 NOTE — TELEPHONE ENCOUNTER
[facility] [phone]     Call this number for appointment.    Francisco Enriquez MD
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
## 2021-02-01 NOTE — TELEPHONE ENCOUNTER
Called [facility], they have not mailed out disc, nor did they receive the first request. HELENE re-faxed to [phone].

## 2021-02-02 ENCOUNTER — THERAPY VISIT (OUTPATIENT)
Dept: OCCUPATIONAL THERAPY | Facility: CLINIC | Age: 41
End: 2021-02-02
Payer: COMMERCIAL

## 2021-02-02 ENCOUNTER — THERAPY VISIT (OUTPATIENT)
Dept: PHYSICAL THERAPY | Facility: CLINIC | Age: 41
End: 2021-02-02
Payer: COMMERCIAL

## 2021-02-02 DIAGNOSIS — Z98.1 HX OF FUSION OF CERVICAL SPINE: ICD-10-CM

## 2021-02-02 DIAGNOSIS — Z98.1 HISTORY OF THORACIC SPINAL FUSION: ICD-10-CM

## 2021-02-02 DIAGNOSIS — R20.2 PARESTHESIA: ICD-10-CM

## 2021-02-02 DIAGNOSIS — R29.898 HAND WEAKNESS: ICD-10-CM

## 2021-02-02 DIAGNOSIS — S93.401D INVERSION SPRAIN OF RIGHT ANKLE, SUBSEQUENT ENCOUNTER: ICD-10-CM

## 2021-02-02 PROCEDURE — 97112 NEUROMUSCULAR REEDUCATION: CPT | Mod: GO | Performed by: OCCUPATIONAL THERAPIST

## 2021-02-02 PROCEDURE — 97110 THERAPEUTIC EXERCISES: CPT | Mod: GO | Performed by: OCCUPATIONAL THERAPIST

## 2021-02-02 PROCEDURE — 97112 NEUROMUSCULAR REEDUCATION: CPT | Mod: GP | Performed by: PHYSICAL THERAPY ASSISTANT

## 2021-02-02 PROCEDURE — 97110 THERAPEUTIC EXERCISES: CPT | Mod: GP | Performed by: PHYSICAL THERAPY ASSISTANT

## 2021-02-02 NOTE — PROGRESS NOTES
Hand Therapy Objective note 2/2/21    Diagnosis: Hand weakness  DOI: 7/31/20    Objective:     Pertinent medical history includes:   Past Medical History:   Diagnosis Date     Anxiety      History of atrial fibrillation     s/p ablation     Hypertension      Thoracic spondylosis with myelopathy     He reports depression ,heart problems, high  Blood pressure, history of fractures, implanted device, numbness and tingling, sleep disorder apnea, severe dizziness, significant weakness    Medical allergies:  No Known Allergies    Surgical history:   Past Surgical History:   Procedure Laterality Date     BACK SURGERY  8/2020    T8-T11 fusion due to trauma     CARDIAC SURGERY  2018    Atrial ablation     ENT SURGERY  2019    UPPP     HEAD & NECK SURGERY  8/2020    C6-C7 fusion due to trauma     ORTHOPEDIC SURGERY  2005    Knee meniscus repair     He reports a history of knee cervical and thoracic surgeries, atrial ablation.     Medication history:   Current Outpatient Medications   Medication     acetaminophen 500 MG CAPS     escitalopram (LEXAPRO) 10 MG tablet     losartan-hydrochlorothiazide (HYZAAR) 50-12.5 MG tablet     Pregabalin (LYRICA) 200 MG capsule     pregabalin (LYRICA) 300 MG capsule     traMADol (ULTRAM) 50 MG tablet     No current facility-administered medications for this visit.        Pain Level (Scale 0-10)   12/23/2020 2/2/21   At Rest 0 0   With Use 6 4     Pain Description  Date 12/23/2020 2/2/21   Location L hand fingertips, R hand ring finger and small finger B hands, RF and SF   Pain Quality Numb and Tingling Numb, tingling, pain   Frequency intermittent   intermittent   Pain is worst  daytime or nighttime Day or night   Exacerbated by  activity  activity   Relieved by Rest, Lyrica  Rest, lyrica   Progression Getting better  Improving     Posture  Normal    Edema  None    Sensation  Decreased Ulnar Nerve distribution per pt report (R) and Glove Like per pt report to fingertips  (L)      ROM  Hand  12/23/2020 12/23/2020 2/2/21   AROM(PROM) L R R   Ring MP 0/90 HE/85 0/95   PIP 0/90 0/88 0/100   DIP 0/70 0/65 0/75   KAISER 250 238 270   Small MP 0/90 HE/85 HE/103   PIP 0/90 -30/89 -15/94   DIP 0/70 0/70 0/85   KAISER 250 151 267     Elbow flex/ext, pronation/supination, and wrist all planes are WNL. Limited L hand thumb MP flex/ext.     Special Tests   - none    + mild    ++ moderate    +++ severe       12/23/2020   Elbow Flexion Test -   Tinels Cubital Tunnel -   Tinels [place] -   Median Nerve Compression at Pronator -   [place] test for lumbrical incursion  (fist x 30 secs) nt   Tinels at Carpal Tunnel -   Phalens nt   Radial nerve compression at PIN site -   Tinels DRSN -     Neural Tension Testing  UNT: Ulnar Neurodynamic Test (based on MEGHAN Jacobo's ULNT)   12/23/2020   0-5 Scale 3   Position:   0/5: Arm across abdomen in coronal plane  1/5: ER to neutral ABD shoulder to 45 degrees  2/5: ER shoulder to 90 degrees  3/5: Block shoulder and ABD shoulder to 110 degrees  4/5: Fully pronate forearm, extend wrist and ring and small fingers  5/5: Flex elbow and bring hand to side of face  Notes:    (+) indicates beyond grade level but less than [facility] to next level    (-) indicates over [facility] to level    S1  onset/change of patient's symptoms    S2 definite stop point based on patient's discomfort level    Manual Muscle Testing, Fingers    Date  12/23/2020 12/23/2020    L R   DIP Flexion     1st flexor profundus (median) C8-T1 5 5   2nd flexor profundus (median) C8-T1 5 5   3rd flexor profundus(ulnar) C8-T1 5 5   4th flexor profundus (ulnar) C8-T1 5 5   5th MP Flexion      FDM (ulnar) C8-T1 5 4+   PIP Flexion     1st flexor superficialis (median) C7-T1 5 5   2nd flexor superficialis (median) C7-T1 5 5   3rd flexor superficialis (median) C7-T1 5 5   4th flexor superficialis (median) C7-T1 5 5   Adduction      1st palmar interosseus (ulnar) C8-T1 5 4+   2nd palmar interosseus (ulnar) C8-T1 5 3+   3rd palmar  interosseus (ulnar) C8-T1 5 3+   Abduction      1st dorsal interosseus (ulnar) C8-T1 5 5   2nd dorsal interosseus (ulnar) C8-T1 5 4+   3rd dorsal interosseus (ulnar) C8-T1 5 4   4th dorsal interosseus (ulnar) C8-T1 5 3+   MP Extension      1st extensor digitorum (radial) C7-8 5 5   2nd extensor digitorum (radial) C7-8 5 5   3rd extensor digitorum (radial) C7-8 5 5   4th extensor digitorum (radial) C7-8 5 5   EDM (radial) C7-8 5 5   IP Extension     1st lumbrical (median) C8-T1 5 5   2nd lumbrical (median) C8-T1 5 5   3rd lumbrical (ulnar) C8-T1 5 4   4th lumbrical (ulnar) C8-T1 5 4     Grading system:   5, normal, the part moves through full ROM against gravity and takes maximal resistance.  4, good, the part moves through full ROM against gravity and takes moderate resistance.   4-, good minus, the part moves through full ROM against gravity and takes less than moderate resistance.   3+, fair plus the part moves through full ROM against gravity and takes minimal resistance before it breaks.   3, fair, the part moves through full ROM against gravity and is unable to take any added resistance.   2+, poor plus, the part moves through full ROM in a gravity-eliminated plane, takes minimal resistance, and then breaks.   2, poor, the part moves through full ROM in a gravity-eliminated plane with no added resistance.   2-, poor minus, the part moves less than full ROM in a gravity eliminated plan, no resistance.   1, trace, tension is palpated in the muscle or tendon, but no motion occurs at the joint.   0, zero, no tension is palpated in the muscle or tendon, and no motion occurs.    Resisted Testing  Pain Report:  - none    + mild    ++ moderate    +++ severe    12/23/2020   Elbow Extension -   Elbow Flexion -   Supination  -   Pronation -   Wrist Ext with RD, Elbow at side -   Wrist Ext with UD, Elbow at side -   Wrist Ext with RD, Elbow Ext -   Wrist Ext with UD, Elbow Ext -   Wrist Flex with RD, Elbow at side -  "  Wrist Flex with UD, Elbow at side -   Wrist Flex with RD, Elbow Ext -   Wrist Flex with UD, Elbow Ext -   EDC with Elbow at side -   EDC with Elbow Ext -   Long Finger Test -   FDS -     Strength   (Measured in pounds)  Pain Report: - none  + mild    ++ moderate    +++ severe    12/23/2020 12/23/2020 2/2/21 2/2/21   Trials L R L R   1  2  3 110  115  115 75  76  70 106  94  102 90  90  90   Average 113 74 101 90     Lat Pinch 12/23/2020 12/23/2020 2/2/21 2/2/21   Trials L R L R   1  2  3 21 18 21.5 17  19  17   Average 21 18  18     3 Pt Pinch 12/23/2020 12/23/2020 2/2/21 2/2/21   Trials L R L R   1  2  3 24 17 28 26   Average 24 17       Nine-Hole Peg Test   12/23/2020   Hand Time in seconds   Right 43   Left 24       Home Exercise Program:  Nerve Gliding Distal Ulnar   EMR Notes   HEP -  Sets 1   Reps 10   Sessions per day 1   Notes   Nerve Gliding Proximal Ulnar   EMR Notes   HEP -  Sets 1   Reps 10   Sessions per day 1   Notes   Intrinsic Strengthening Finger Abduction   EMR Notes   HEP -  Sets 2-3   Reps 8   Sessions per day 1   Notes   Intrinsic Finger Active Range of Motion Ab and Adduction   EMR Notes   HEP -  Sets 1   Reps 10   Sessions per day 3-6   Notes   Orthosis Wear and Care   EMR Notes   HEP -  Sets   Reps   Sessions per day   Notes Wear your hand splint during activity to encourage functional grasping and prevent \"ulnar drift\" of ring and pinky fingers during motion. Take it off for rest, light activity, and sleep, as well as for hand hygiene and your exercises.     Next Visit:  Strengthening  Coordination  Functional activities   Nerve gliding       "

## 2021-02-04 ENCOUNTER — THERAPY VISIT (OUTPATIENT)
Dept: PHYSICAL THERAPY | Facility: CLINIC | Age: 41
End: 2021-02-04
Payer: COMMERCIAL

## 2021-02-04 DIAGNOSIS — Z98.1 HX OF FUSION OF CERVICAL SPINE: ICD-10-CM

## 2021-02-04 DIAGNOSIS — S93.401A INVERSION SPRAIN OF RIGHT ANKLE: ICD-10-CM

## 2021-02-04 DIAGNOSIS — Z98.1 HISTORY OF THORACIC SPINAL FUSION: ICD-10-CM

## 2021-02-04 PROCEDURE — 97032 APPL MODALITY 1+ESTIM EA 15: CPT | Mod: GP | Performed by: PHYSICAL THERAPIST

## 2021-02-09 ENCOUNTER — THERAPY VISIT (OUTPATIENT)
Dept: PHYSICAL THERAPY | Facility: CLINIC | Age: 41
End: 2021-02-09
Payer: COMMERCIAL

## 2021-02-09 DIAGNOSIS — Z98.1 HX OF FUSION OF CERVICAL SPINE: ICD-10-CM

## 2021-02-09 DIAGNOSIS — Z98.1 HISTORY OF THORACIC SPINAL FUSION: ICD-10-CM

## 2021-02-09 DIAGNOSIS — S93.401A INVERSION SPRAIN OF RIGHT ANKLE: ICD-10-CM

## 2021-02-09 PROCEDURE — 97110 THERAPEUTIC EXERCISES: CPT | Mod: GP | Performed by: PHYSICAL THERAPIST

## 2021-02-09 PROCEDURE — 97112 NEUROMUSCULAR REEDUCATION: CPT | Mod: GP | Performed by: PHYSICAL THERAPIST

## 2021-02-10 ENCOUNTER — TRANSFERRED RECORDS (OUTPATIENT)
Dept: HEALTH INFORMATION MANAGEMENT | Facility: CLINIC | Age: 41
End: 2021-02-10

## 2021-02-11 ENCOUNTER — MYC MEDICAL ADVICE (OUTPATIENT)
Dept: INTERNAL MEDICINE | Facility: CLINIC | Age: 41
End: 2021-02-11

## 2021-02-11 ENCOUNTER — THERAPY VISIT (OUTPATIENT)
Dept: PHYSICAL THERAPY | Facility: CLINIC | Age: 41
End: 2021-02-11
Payer: COMMERCIAL

## 2021-02-11 DIAGNOSIS — Z98.1 HX OF FUSION OF CERVICAL SPINE: Primary | ICD-10-CM

## 2021-02-11 DIAGNOSIS — S93.401A INVERSION SPRAIN OF RIGHT ANKLE: ICD-10-CM

## 2021-02-11 DIAGNOSIS — S14.109D CERVICAL SPINAL CORD INJURY, SUBSEQUENT ENCOUNTER (H): ICD-10-CM

## 2021-02-11 PROCEDURE — 97110 THERAPEUTIC EXERCISES: CPT | Mod: GP | Performed by: PHYSICAL THERAPIST

## 2021-02-11 PROCEDURE — 97112 NEUROMUSCULAR REEDUCATION: CPT | Mod: GP | Performed by: PHYSICAL THERAPIST

## 2021-02-13 RX ORDER — PREGABALIN 300 MG/1
300 CAPSULE ORAL 2 TIMES DAILY
Qty: 180 CAPSULE | Refills: 1 | Status: SHIPPED | OUTPATIENT
Start: 2021-02-13 | End: 2021-05-13

## 2021-02-13 RX ORDER — PREGABALIN 200 MG/1
200 CAPSULE ORAL DAILY
Qty: 90 CAPSULE | Refills: 1 | Status: SHIPPED | OUTPATIENT
Start: 2021-02-13 | End: 2021-05-13

## 2021-02-16 ENCOUNTER — THERAPY VISIT (OUTPATIENT)
Dept: PHYSICAL THERAPY | Facility: CLINIC | Age: 41
End: 2021-02-16
Payer: COMMERCIAL

## 2021-02-16 DIAGNOSIS — Z98.1 HX OF FUSION OF CERVICAL SPINE: ICD-10-CM

## 2021-02-16 DIAGNOSIS — Z98.1 HISTORY OF THORACIC SPINAL FUSION: ICD-10-CM

## 2021-02-16 PROCEDURE — 97032 APPL MODALITY 1+ESTIM EA 15: CPT | Mod: GP | Performed by: PHYSICAL THERAPIST

## 2021-02-16 PROCEDURE — 97112 NEUROMUSCULAR REEDUCATION: CPT | Mod: GP | Performed by: PHYSICAL THERAPIST

## 2021-02-18 ENCOUNTER — THERAPY VISIT (OUTPATIENT)
Dept: PHYSICAL THERAPY | Facility: CLINIC | Age: 41
End: 2021-02-18
Payer: COMMERCIAL

## 2021-02-18 DIAGNOSIS — Z98.1 HISTORY OF THORACIC SPINAL FUSION: ICD-10-CM

## 2021-02-18 DIAGNOSIS — Z98.1 HX OF FUSION OF CERVICAL SPINE: ICD-10-CM

## 2021-02-18 PROCEDURE — 97032 APPL MODALITY 1+ESTIM EA 15: CPT | Mod: GP | Performed by: PHYSICAL THERAPIST

## 2021-02-18 PROCEDURE — 97112 NEUROMUSCULAR REEDUCATION: CPT | Mod: GP | Performed by: PHYSICAL THERAPIST

## 2021-02-23 ENCOUNTER — THERAPY VISIT (OUTPATIENT)
Dept: OCCUPATIONAL THERAPY | Facility: CLINIC | Age: 41
End: 2021-02-23
Payer: COMMERCIAL

## 2021-02-23 ENCOUNTER — THERAPY VISIT (OUTPATIENT)
Dept: PHYSICAL THERAPY | Facility: CLINIC | Age: 41
End: 2021-02-23
Payer: COMMERCIAL

## 2021-02-23 DIAGNOSIS — S93.401A INVERSION SPRAIN OF RIGHT ANKLE: ICD-10-CM

## 2021-02-23 DIAGNOSIS — R20.2 PARESTHESIA: ICD-10-CM

## 2021-02-23 DIAGNOSIS — R29.898 HAND WEAKNESS: Primary | ICD-10-CM

## 2021-02-23 PROCEDURE — 97110 THERAPEUTIC EXERCISES: CPT | Mod: GO | Performed by: OCCUPATIONAL THERAPIST

## 2021-02-23 PROCEDURE — 97110 THERAPEUTIC EXERCISES: CPT | Mod: GP | Performed by: PHYSICAL THERAPIST

## 2021-02-23 PROCEDURE — 97112 NEUROMUSCULAR REEDUCATION: CPT | Mod: GO | Performed by: OCCUPATIONAL THERAPIST

## 2021-02-23 PROCEDURE — 97112 NEUROMUSCULAR REEDUCATION: CPT | Mod: GP | Performed by: PHYSICAL THERAPIST

## 2021-02-23 NOTE — PROGRESS NOTES
Hand Therapy Objective note 2/23/21    Diagnosis: Hand weakness  DOI: 7/31/20    Objective:     Pertinent medical history includes:   Past Medical History:   Diagnosis Date     Anxiety      History of atrial fibrillation     s/p ablation     Hypertension      Thoracic spondylosis with myelopathy     He reports depression ,heart problems, high  Blood pressure, history of fractures, implanted device, numbness and tingling, sleep disorder apnea, severe dizziness, significant weakness    Medical allergies:  No Known Allergies    Surgical history:   Past Surgical History:   Procedure Laterality Date     BACK SURGERY  8/2020    T8-T11 fusion due to trauma     CARDIAC SURGERY  2018    Atrial ablation     ENT SURGERY  2019    UPPP     HEAD & NECK SURGERY  8/2020    C6-C7 fusion due to trauma     ORTHOPEDIC SURGERY  2005    Knee meniscus repair     He reports a history of knee cervical and thoracic surgeries, atrial ablation.     Medication history:   Current Outpatient Medications   Medication     acetaminophen 500 MG CAPS     escitalopram (LEXAPRO) 10 MG tablet     losartan-hydrochlorothiazide (HYZAAR) 50-12.5 MG tablet     Pregabalin (LYRICA) 200 MG capsule     pregabalin (LYRICA) 300 MG capsule     traMADol (ULTRAM) 50 MG tablet     No current facility-administered medications for this visit.        Pain Level (Scale 0-10)   12/23/2020 2/2/21   At Rest 0 0   With Use 6 4     Pain Description  Date 12/23/2020 2/2/21   Location L hand fingertips, R hand ring finger and small finger B hands, RF and SF   Pain Quality Numb and Tingling Numb, tingling, pain   Frequency intermittent   intermittent   Pain is worst  daytime or nighttime Day or night   Exacerbated by  activity  activity   Relieved by Rest, Lyrica  Rest, lyrica   Progression Getting better  Improving     Posture  Normal    Edema  None    Sensation  Decreased Ulnar Nerve distribution per pt report (R) and Glove Like per pt report to fingertips  (L)      ROM  Hand  12/23/2020 12/23/2020 2/2/21   AROM(PROM) L R R   Ring MP 0/90 HE/85 0/95   PIP 0/90 0/88 0/100   DIP 0/70 0/65 0/75   KAISER 250 238 270   Small MP 0/90 HE/85 HE/103   PIP 0/90 -30/89 -15/94   DIP 0/70 0/70 0/85   KAISER 250 151 267     Elbow flex/ext, pronation/supination, and wrist all planes are WNL. Limited L hand thumb MP flex/ext.     Special Tests   - none    + mild    ++ moderate    +++ severe       12/23/2020   Elbow Flexion Test -   Tinels Cubital Tunnel -   Tinels [place] -   Median Nerve Compression at Pronator -   [place] test for lumbrical incursion  (fist x 30 secs) nt   Tinels at Carpal Tunnel -   Phalens nt   Radial nerve compression at PIN site -   Tinels DRSN -     Neural Tension Testing  UNT: Ulnar Neurodynamic Test (based on MEGHAN Jacobo's ULNT)   12/23/2020   0-5 Scale 3   Position:   0/5: Arm across abdomen in coronal plane  1/5: ER to neutral ABD shoulder to 45 degrees  2/5: ER shoulder to 90 degrees  3/5: Block shoulder and ABD shoulder to 110 degrees  4/5: Fully pronate forearm, extend wrist and ring and small fingers  5/5: Flex elbow and bring hand to side of face  Notes:    (+) indicates beyond grade level but less than [facility] to next level    (-) indicates over [facility] to level    S1  onset/change of patient's symptoms    S2 definite stop point based on patient's discomfort level    Manual Muscle Testing, Fingers    Date  12/23/2020 12/23/2020    L R   DIP Flexion     1st flexor profundus (median) C8-T1 5 5   2nd flexor profundus (median) C8-T1 5 5   3rd flexor profundus(ulnar) C8-T1 5 5   4th flexor profundus (ulnar) C8-T1 5 5   5th MP Flexion      FDM (ulnar) C8-T1 5 4+   PIP Flexion     1st flexor superficialis (median) C7-T1 5 5   2nd flexor superficialis (median) C7-T1 5 5   3rd flexor superficialis (median) C7-T1 5 5   4th flexor superficialis (median) C7-T1 5 5   Adduction      1st palmar interosseus (ulnar) C8-T1 5 4+   2nd palmar interosseus (ulnar) C8-T1 5 3+   3rd palmar  interosseus (ulnar) C8-T1 5 3+   Abduction      1st dorsal interosseus (ulnar) C8-T1 5 5   2nd dorsal interosseus (ulnar) C8-T1 5 4+   3rd dorsal interosseus (ulnar) C8-T1 5 4   4th dorsal interosseus (ulnar) C8-T1 5 3+   MP Extension      1st extensor digitorum (radial) C7-8 5 5   2nd extensor digitorum (radial) C7-8 5 5   3rd extensor digitorum (radial) C7-8 5 5   4th extensor digitorum (radial) C7-8 5 5   EDM (radial) C7-8 5 5   IP Extension     1st lumbrical (median) C8-T1 5 5   2nd lumbrical (median) C8-T1 5 5   3rd lumbrical (ulnar) C8-T1 5 4   4th lumbrical (ulnar) C8-T1 5 4     Grading system:   5, normal, the part moves through full ROM against gravity and takes maximal resistance.  4, good, the part moves through full ROM against gravity and takes moderate resistance.   4-, good minus, the part moves through full ROM against gravity and takes less than moderate resistance.   3+, fair plus the part moves through full ROM against gravity and takes minimal resistance before it breaks.   3, fair, the part moves through full ROM against gravity and is unable to take any added resistance.   2+, poor plus, the part moves through full ROM in a gravity-eliminated plane, takes minimal resistance, and then breaks.   2, poor, the part moves through full ROM in a gravity-eliminated plane with no added resistance.   2-, poor minus, the part moves less than full ROM in a gravity eliminated plan, no resistance.   1, trace, tension is palpated in the muscle or tendon, but no motion occurs at the joint.   0, zero, no tension is palpated in the muscle or tendon, and no motion occurs.    Resisted Testing  Pain Report:  - none    + mild    ++ moderate    +++ severe    12/23/2020   Elbow Extension -   Elbow Flexion -   Supination  -   Pronation -   Wrist Ext with RD, Elbow at side -   Wrist Ext with UD, Elbow at side -   Wrist Ext with RD, Elbow Ext -   Wrist Ext with UD, Elbow Ext -   Wrist Flex with RD, Elbow at side -  "  Wrist Flex with UD, Elbow at side -   Wrist Flex with RD, Elbow Ext -   Wrist Flex with UD, Elbow Ext -   EDC with Elbow at side -   EDC with Elbow Ext -   Long Finger Test -   FDS -     Strength   (Measured in pounds)  Pain Report: - none  + mild    ++ moderate    +++ severe    12/23/2020 12/23/2020 2/2/21 2/2/21   Trials L R L R   1  2  3 110  115  115 75  76  70 106  94  102 90  90  90   Average 113 74 101 90     Lat Pinch 12/23/2020 12/23/2020 2/2/21 2/2/21   Trials L R L R   1  2  3 21 18 21.5 17  19  17   Average 21 18  18     3 Pt Pinch 12/23/2020 12/23/2020 2/2/21 2/2/21   Trials L R L R   1  2  3 24 17 28 26   Average 24 17       Nine-Hole Peg Test   12/23/2020 2/23/21   Hand Time in seconds Sec   Right 43 48   Left 24 21.5       Home Exercise Program:  Nerve Gliding Distal Ulnar   EMR Notes   HEP -  Sets 1   Reps 10   Sessions per day 1   Notes   Nerve Gliding Proximal Ulnar   EMR Notes   HEP -  Sets 1   Reps 10   Sessions per day 1   Notes   Intrinsic Strengthening Finger Abduction   EMR Notes   HEP -  Sets 2-3   Reps 8   Sessions per day 1   Notes   Intrinsic Finger Active Range of Motion Ab and Adduction   EMR Notes   HEP -  Sets 1   Reps 10   Sessions per day 3-6   Notes   Orthosis Wear and Care   EMR Notes   HEP -  Sets   Reps   Sessions per day   Notes Wear your hand splint during activity to encourage functional grasping and prevent \"ulnar drift\" of ring and pinky fingers during motion. Take it off for rest, light activity, and sleep, as well as for hand hygiene and your exercises.     Next Visit:  Strengthening  Coordination  Functional activities   Nerve gliding       "

## 2021-03-02 ENCOUNTER — THERAPY VISIT (OUTPATIENT)
Dept: PHYSICAL THERAPY | Facility: CLINIC | Age: 41
End: 2021-03-02
Payer: COMMERCIAL

## 2021-03-02 ENCOUNTER — OFFICE VISIT (OUTPATIENT)
Dept: ORTHOPEDICS | Facility: CLINIC | Age: 41
End: 2021-03-02
Payer: COMMERCIAL

## 2021-03-02 VITALS
BODY MASS INDEX: 28.88 KG/M2 | SYSTOLIC BLOOD PRESSURE: 122 MMHG | HEIGHT: 74 IN | WEIGHT: 225 LBS | DIASTOLIC BLOOD PRESSURE: 70 MMHG

## 2021-03-02 DIAGNOSIS — Z98.1 HISTORY OF THORACIC SPINAL FUSION: ICD-10-CM

## 2021-03-02 DIAGNOSIS — Z98.1 HX OF FUSION OF CERVICAL SPINE: ICD-10-CM

## 2021-03-02 DIAGNOSIS — S93.491D SPRAIN OF ANTERIOR TALOFIBULAR LIGAMENT OF RIGHT ANKLE, SUBSEQUENT ENCOUNTER: Primary | ICD-10-CM

## 2021-03-02 DIAGNOSIS — M25.371 INSTABILITY OF ANKLE JOINT, RIGHT: ICD-10-CM

## 2021-03-02 PROCEDURE — 99214 OFFICE O/P EST MOD 30 MIN: CPT | Performed by: FAMILY MEDICINE

## 2021-03-02 PROCEDURE — 97110 THERAPEUTIC EXERCISES: CPT | Mod: GP | Performed by: PHYSICAL THERAPIST

## 2021-03-02 PROCEDURE — 97112 NEUROMUSCULAR REEDUCATION: CPT | Mod: GP | Performed by: PHYSICAL THERAPIST

## 2021-03-02 ASSESSMENT — MIFFLIN-ST. JEOR: SCORE: 1990.34

## 2021-03-02 NOTE — PROGRESS NOTES
ASSESSMENT & PLAN  Patient Instructions     1. Sprain of anterior talofibular ligament of right ankle, subsequent encounter    2. Instability of ankle joint, right      -Patient has right ankle pain and instability likely due to significant chronic lateral ankle sprains  -Patient continues to have episodes of instability despite wearing a lace up ankle brace and extensive physical therapy and home exercises  -Patient will get an MRI of the right ankle for further evaluation of structural injuries.  Your MRI has been ordered. You may go down to the first floor, [address] to schedule the MRI in person, or call [phone] to schedule over the phone. Once you know the date of your MRI, please call my office and schedule a follow-up visit for 2 days after that.  -We had an in-depth discussion today regarding patient's chronic spinal injuries. Patient had surgery to stabilize his lower thoracic spine. However, the hardware created excessive kyphotic curvature causing irritation of the spinal cord which may be causing bilateral lower extremity weakness, paresthesias and dysfunction  -Patient is planning to have a repeat surgery in April to remove the previous hardware, reinstall the hardware to decrease kyphosis, fuse to lower levels which should hopefully alleviate the irritation on the spinal cord and improve his downstream his symptoms.  -We will assess his ankle stability and function after his lumbar surgery before deciding on any surgical intervention.  -Based on his MRI results, we may consider custom AFO braces if appropriate  -Call direct clinic number [[phone]] at any time with questions or concerns.    Albert Yeo MD [facility]  [facility]          -----    SUBJECTIVE:  Albert Zamarripa is a 40 year old male who is seen in follow-up for chronic ankle pain and instability due to recurrent injuries and falls causing ligament sprains.They were last  "seen 1/19/21.     Since their last visit reports 10% improvement. Feels like it is getting a little stronger, but still feels weak. Will still feel instability in the tri-lock brace when walking down stairs or down an incline.  They indicate that their current pain level is 1.5/10 states more stiffness and weakness then pain. They have tried home exercises, physical therapy (4 visits) and tri-lock ankle brace.      The patient is seen by themselves.    Patient's past medical, surgical, social, and family histories were reviewed today and no changes are noted.    REVIEW OF SYSTEMS:  Constitutional: NEGATIVE for fever, chills, change in weight  Skin: NEGATIVE for worrisome rashes, moles or lesions  GI/: NEGATIVE for bowel or bladder changes  Neuro: NEGATIVE for weakness, dizziness or paresthesias    OBJECTIVE:  /70   Ht 1.88 m (6' 2\")   Wt 102.1 kg (225 lb)   BMI 28.89 kg/m     General: healthy, alert and in no distress  HEENT: no scleral icterus or conjunctival erythema  Skin: no suspicious lesions or rash. No jaundice.  CV: regular rhythm by palpation, no pedal edema  Resp: normal respiratory effort without conversational dyspnea   Psych: normal mood and affect  Gait: normal steady gait with appropriate coordination and balance  Neuro: normal light touch sensory exam of the extremities.    MSK:  RIGHT ANKLE  Inspection:    No swelling or ecchymosis is observed  Palpation:    Tender about the ATFL, CFL and PTFL. Remainder of bony and ligamentous landmarks are nontender.  Range of Motion:     Plantarflexion within normal limits / dorsiflexion within normal limits / inversion within normal limits / eversion within normal limits  Strength:    within normal limits  Special Tests:    positive anterior drawer, positive talar tilt, negative squeeze test. Able to perform heel raise and Unable to hop.         Albert Yeo MD, [facility]        "

## 2021-03-02 NOTE — LETTER
3/2/2021         RE: Albert Zamarripa  [address] [place] [address]        Dear Colleague,    Thank you for referring your patient, Albert Zamarripa, to the [facility] [place]. Please see a copy of my visit note below.    ASSESSMENT & PLAN  Patient Instructions     1. Sprain of anterior talofibular ligament of right ankle, subsequent encounter    2. Instability of ankle joint, right      -Patient has right ankle pain and instability likely due to significant chronic lateral ankle sprains  -Patient continues to have episodes of instability despite wearing a lace up ankle brace and extensive physical therapy and home exercises  -Patient will get an MRI of the right ankle for further evaluation of structural injuries.  Your MRI has been ordered. You may go down to the first floor, [address] to schedule the MRI in person, or call [phone] to schedule over the phone. Once you know the date of your MRI, please call my office and schedule a follow-up visit for 2 days after that.  -We had an in-depth discussion today regarding patient's chronic spinal injuries. Patient had surgery to stabilize his lower thoracic spine. However, the hardware created excessive kyphotic curvature causing irritation of the spinal cord which may be causing bilateral lower extremity weakness, paresthesias and dysfunction  -Patient is planning to have a repeat surgery in April to remove the previous hardware, reinstall the hardware to decrease kyphosis, fuse to lower levels which should hopefully alleviate the irritation on the spinal cord and improve his downstream his symptoms.  -We will assess his ankle stability and function after his lumbar surgery before deciding on any surgical intervention.  -Based on his MRI results, we may consider custom AFO braces if appropriate  -Call direct clinic number [[phone]] at any time with questions or concerns.    Albert Yeo MD  "CAQSM  [facility]  [facility] [place]          -----    SUBJECTIVE:  Albert Zamarripa is a 40 year old male who is seen in follow-up for chronic ankle pain and instability due to recurrent injuries and falls causing ligament sprains.They were last seen 1/19/21.     Since their last visit reports 10% improvement. Feels like it is getting a little stronger, but still feels weak. Will still feel instability in the tri-lock brace when walking down stairs or down an incline.  They indicate that their current pain level is 1.5/10 states more stiffness and weakness then pain. They have tried home exercises, physical therapy (4 visits) and tri-lock ankle brace.      The patient is seen by themselves.    Patient's past medical, surgical, social, and family histories were reviewed today and no changes are noted.    REVIEW OF SYSTEMS:  Constitutional: NEGATIVE for fever, chills, change in weight  Skin: NEGATIVE for worrisome rashes, moles or lesions  GI/: NEGATIVE for bowel or bladder changes  Neuro: NEGATIVE for weakness, dizziness or paresthesias    OBJECTIVE:  /70   Ht 1.88 m (6' 2\")   Wt 102.1 kg (225 lb)   BMI 28.89 kg/m     General: healthy, alert and in no distress  HEENT: no scleral icterus or conjunctival erythema  Skin: no suspicious lesions or rash. No jaundice.  CV: regular rhythm by palpation, no pedal edema  Resp: normal respiratory effort without conversational dyspnea   Psych: normal mood and affect  Gait: normal steady gait with appropriate coordination and balance  Neuro: normal light touch sensory exam of the extremities.    MSK:  RIGHT ANKLE  Inspection:    No swelling or ecchymosis is observed  Palpation:    Tender about the ATFL, CFL and PTFL. Remainder of bony and ligamentous landmarks are nontender.  Range of Motion:     Plantarflexion within normal limits / dorsiflexion within normal limits / inversion within normal limits / eversion within normal " limits  Strength:    within normal limits  Special Tests:    positive anterior drawer, positive talar tilt, negative squeeze test. Able to perform heel raise and Unable to hop.         Albert Yeo MD, [facility]            Again, thank you for allowing me to participate in the care of your patient.        Sincerely,        Albert Yeo, MD

## 2021-03-02 NOTE — PATIENT INSTRUCTIONS
1. Sprain of anterior talofibular ligament of right ankle, subsequent encounter    2. Instability of ankle joint, right      -Patient has right ankle pain and instability likely due to significant chronic lateral ankle sprains  -Patient continues to have episodes of instability despite wearing a lace up ankle brace and extensive physical therapy and home exercises  -Patient will get an MRI of the right ankle for further evaluation of structural injuries.  Your MRI has been ordered. You may go down to the first floor, [address] to schedule the MRI in person, or call [phone] to schedule over the phone. Once you know the date of your MRI, please call my office and schedule a follow-up visit for 2 days after that.  -We had an in-depth discussion today regarding patient's chronic spinal injuries. Patient had surgery to stabilize his lower thoracic spine. However, the hardware created excessive kyphotic curvature causing irritation of the spinal cord which may be causing bilateral lower extremity weakness, paresthesias and dysfunction  -Patient is planning to have a repeat surgery in April to remove the previous hardware, reinstall the hardware to decrease kyphosis, fuse to lower levels which should hopefully alleviate the irritation on the spinal cord and improve his downstream his symptoms.  -We will assess his ankle stability and function after his lumbar surgery before deciding on any surgical intervention.  -Based on his MRI results, we may consider custom AFO braces if appropriate  -Call direct clinic number [[phone]] at any time with questions or concerns.    Albert Yeo MD [facility]  [facility] [place]

## 2021-03-04 ENCOUNTER — VIRTUAL VISIT (OUTPATIENT)
Dept: INTERNAL MEDICINE | Facility: CLINIC | Age: 41
End: 2021-03-04
Payer: COMMERCIAL

## 2021-03-04 DIAGNOSIS — R20.2 PARESTHESIA: ICD-10-CM

## 2021-03-04 DIAGNOSIS — F41.9 ANXIETY: ICD-10-CM

## 2021-03-04 DIAGNOSIS — S14.109D CERVICAL SPINAL CORD INJURY, SUBSEQUENT ENCOUNTER (H): Primary | ICD-10-CM

## 2021-03-04 PROCEDURE — 99213 OFFICE O/P EST LOW 20 MIN: CPT | Mod: 95 | Performed by: INTERNAL MEDICINE

## 2021-03-04 RX ORDER — DULOXETIN HYDROCHLORIDE 30 MG/1
30 CAPSULE, DELAYED RELEASE ORAL DAILY
Qty: 90 CAPSULE | Refills: 0 | Status: SHIPPED | OUTPATIENT
Start: 2021-03-04 | End: 2021-05-13

## 2021-03-04 ASSESSMENT — ENCOUNTER SYMPTOMS
PARESTHESIAS: 1
WEAKNESS: 1
NUMBNESS: 1

## 2021-03-04 NOTE — PROGRESS NOTES
"Sandro is a 40 year old who is being evaluated via a billable video visit.      How would you like to obtain your AVS? MyChart  If the video visit is dropped, the invitation should be resent by: Text to cell phone: [phone]  Will anyone else be joining your video visit? No    Video Start Time: 10:41 AM    Assessment & Plan   Problem List Items Addressed This Visit        Nervous and Auditory    Paresthesia    Cervical spinal cord injury, subsequent encounter (H) - Primary      Other Visit Diagnoses     Anxiety        Relevant Medications    DULoxetine (CYMBALTA) 30 MG capsule         Following with [facility] for cervical spinal cord injury.  We will start him on Cymbalta 30 mg once a day and stop Lexapro.  Advised 1 month follow-up to reevaluate symptoms to see if dose has to be adjusted.  Continue Lyrica.    No follow-ups on file.    Francisco Enriquez MD  [facility]    Richie Jo is a 40 year old who presents for the following health issues : medication change request.    HPI   He had cervical spinal cord injury. He saw spine care clinic [facility].  Recommended another surgery, removal of hardware and extension through L1, straightening of kyphosis.  It could help with pain/kyphosis. Discussed about nerve transfer.  CT/MRI/ EMG/DEXA ordered by [facility].  Patient was advised to switch Lexapro to Cymbalta to see if that helps with the mood and pain.    Review of Systems   Neurological: Positive for weakness, numbness and paresthesias.         Objective       Vitals:  No vitals were obtained today due to virtual visit.    Physical Exam \"GENERAL: Healthy, alert and no distress\",\"EYES: Eyes grossly normal to inspection.  No discharge or erythema, or obvious scleral/conjunctival abnormalities.\",\"RESP: No audible wheeze, cough, or visible cyanosis.  No visible retractions or increased work of breathing.  \",\"SKIN: Visible skin clear. No significant rash, abnormal pigmentation or lesions.\",\"NEURO: " "Cranial nerves grossly intact.  Mentation and speech appropriate for age.\",\"PSYCH: Mentation appears normal, affect normal/bright, judgement and insight intact, normal speech and appearance well-groomed.\"      Video-Visit Details    Type of service:  Video Visit    Video End Time:11:01 AM    Originating Location (pt. Location): Home    Distant Location (provider location):  HOME    Platform used for Video Visit: Jefry  "

## 2021-03-04 NOTE — PATIENT INSTRUCTIONS
Patient Education     Duloxetine Oral capsule, gastro-resistant pellets  What is this medicine?  DULOXETINE (doo LOX e teen) is used to treat depression, anxiety, and different types of chronic pain.  This medicine may be used for other purposes; ask your health care provider or pharmacist if you have questions.  What should I tell my health care provider before I take this medicine?  They need to know if you have any of these conditions:    bipolar disorder or a family history of bipolar disorder    glaucoma    kidney disease    liver disease    suicidal thoughts or a previous suicide attempt    taken medicines called MAOIs like Carbex, Eldepryl, Marplan, Nardil, and Parnate within 14 days    an unusual reaction to duloxetine, other medicines, foods, dyes, or preservatives    pregnant or trying to get pregnant    breast-feeding  How should I use this medicine?  Take this medicine by mouth with a glass of water. Follow the directions on the prescription label. Do not cut, crush or chew this medicine. You can take this medicine with or without food. Take your medicine at regular intervals. Do not take your medicine more often than directed. Do not stop taking this medicine suddenly except upon the advice of your doctor. Stopping this medicine too quickly may cause serious side effects or your condition may worsen.  A special MedGuide will be given to you by the pharmacist with each prescription and refill. Be sure to read this information carefully each time.  Talk to your pediatrician regarding the use of this medicine in children. While this drug may be prescribed for children as young as 7 years of age for selected conditions, precautions do apply.  Overdosage: If you think you have taken too much of this medicine contact a poison control center or emergency room at once.  NOTE: This medicine is only for you. Do not share this medicine with others.  What if I miss a dose?  If you miss a dose, take it as soon as  you can. If it is almost time for your next dose, take only that dose. Do not take double or extra doses.  What may interact with this medicine?  Do not take this medicine with any of the following medications:    certain diet drugs like dexfenfluramine, fenfluramine    desvenlafaxine    linezolid    MAOIs like Azilect, Carbex, Eldepryl, Marplan, Nardil, and Parnate    methylene blue (intravenous)    milnacipran    thioridazine    venlafaxine  This medicine may also interact with the following medications:    alcohol    aspirin and aspirin-like medicines    certain antibiotics like ciprofloxacin and enoxacin    certain medicines for blood pressure, heart disease, irregular heart beat    certain medicines for depression, anxiety, or psychotic disturbances    certain medicines for migraine headache like almotriptan, eletriptan, frovatriptan, naratriptan, rizatriptan, sumatriptan, zolmitriptan    certain medicines that treat or prevent blood clots like warfarin, enoxaparin, and dalteparin    cimetidine    fentanyl    lithium    NSAIDS, medicines for pain and inflammation, like ibuprofen or naproxen    phentermine    procarbazine    sibutramine    Quyen's wort    theophylline    tramadol    tryptophan  This list may not describe all possible interactions. Give your health care provider a list of all the medicines, herbs, non-prescription drugs, or dietary supplements you use. Also tell them if you smoke, drink alcohol, or use illegal drugs. Some items may interact with your medicine.  What should I watch for while using this medicine?  Tell your doctor if your symptoms do not get better or if they get worse. Visit your doctor or health care professional for regular checks on your progress. Because it may take several weeks to see the full effects of this medicine, it is important to continue your treatment as prescribed by your doctor.  Patients and their families should watch out for new or worsening thoughts of  suicide or depression. Also watch out for sudden changes in feelings such as feeling anxious, agitated, panicky, irritable, hostile, aggressive, impulsive, severely restless, overly excited and hyperactive, or not being able to sleep. If this happens, especially at the beginning of treatment or after a change in dose, call your health care professional.  You may get drowsy or dizzy. Do not drive, use machinery, or do anything that needs mental alertness until you know how this medicine affects you. Do not stand or sit up quickly, especially if you are an older patient. This reduces the risk of dizzy or fainting spells. Alcohol may interfere with the effect of this medicine. Avoid alcoholic drinks.  This medicine can cause an increase in blood pressure. This medicine can also cause a sudden drop in your blood pressure, which may make you feel faint and increase the chance of a fall. These effects are most common when you first start the medicine or when the dose is increased, or during use of other medicines that can cause a sudden drop in blood pressure. Check with your doctor for instructions on monitoring your blood pressure while taking this medicine.  Your mouth may get dry. Chewing sugarless gum or sucking hard candy, and drinking plenty of water may help. Contact your doctor if the problem does not go away or is severe.  What side effects may I notice from receiving this medicine?  Side effects that you should report to your doctor or health care professional as soon as possible:    allergic reactions like skin rash, itching or hives, swelling of the face, lips, or tongue    changes in blood pressure    confusion    dark urine    dizziness    fast talking and excited feelings or actions that are out of control    fast, irregular heartbeat    fever    general ill feeling or flu-like symptoms    hallucination, loss of contact with reality    light-colored stools    loss of balance or coordination    redness,  blistering, peeling or loosening of the skin, including inside the mouth    right upper belly pain    seizures    suicidal thoughts or other mood changes    trouble concentrating    trouble passing urine or change in the amount of urine    unusual bleeding or bruising    unusually weak or tired    yellowing of the eyes or skin  Side effects that usually do not require medical attention (report to your doctor or health care professional if they continue or are bothersome):    blurred vision    change in appetite    change in sex drive or performance    headache    increased sweating    nausea  This list may not describe all possible side effects. Call your doctor for medical advice about side effects. You may report side effects to FDA at 0-314-CED-4713.  Where should I keep my medicine?  Keep out of the reach of children.  Store at room temperature between 15 and 30 degrees C (59 and 86 degrees F). Throw away any unused medicine after the expiration date.  NOTE:This sheet is a summary. It may not cover all possible information. If you have questions about this medicine, talk to your doctor, pharmacist, or health care provider. Copyright  2016 [employer] Standard

## 2021-03-06 ENCOUNTER — HOSPITAL ENCOUNTER (OUTPATIENT)
Dept: MRI IMAGING | Facility: CLINIC | Age: 41
Discharge: HOME | End: 2021-03-06
Attending: FAMILY MEDICINE | Admitting: FAMILY MEDICINE
Payer: COMMERCIAL

## 2021-03-06 DIAGNOSIS — M25.371 INSTABILITY OF ANKLE JOINT, RIGHT: ICD-10-CM

## 2021-03-06 DIAGNOSIS — S93.491D SPRAIN OF ANTERIOR TALOFIBULAR LIGAMENT OF RIGHT ANKLE, SUBSEQUENT ENCOUNTER: ICD-10-CM

## 2021-03-06 PROCEDURE — 73721 MRI JNT OF LWR EXTRE W/O DYE: CPT | Mod: RT

## 2021-03-06 PROCEDURE — 73721 MRI JNT OF LWR EXTRE W/O DYE: CPT | Mod: 26 | Performed by: RADIOLOGY

## 2021-03-09 ENCOUNTER — VIRTUAL VISIT (OUTPATIENT)
Dept: ORTHOPEDICS | Facility: CLINIC | Age: 41
End: 2021-03-09
Payer: COMMERCIAL

## 2021-03-09 ENCOUNTER — THERAPY VISIT (OUTPATIENT)
Dept: PHYSICAL THERAPY | Facility: CLINIC | Age: 41
End: 2021-03-09
Payer: COMMERCIAL

## 2021-03-09 DIAGNOSIS — S93.491D SPRAIN OF ANTERIOR TALOFIBULAR LIGAMENT OF RIGHT ANKLE, SUBSEQUENT ENCOUNTER: Primary | ICD-10-CM

## 2021-03-09 DIAGNOSIS — S93.431D SPRAIN OF TIBIOFIBULAR LIGAMENT OF RIGHT ANKLE, SUBSEQUENT ENCOUNTER: ICD-10-CM

## 2021-03-09 DIAGNOSIS — Z98.1 HISTORY OF THORACIC SPINAL FUSION: ICD-10-CM

## 2021-03-09 DIAGNOSIS — S86.311D PERONEAL TENDON TEAR, RIGHT, SUBSEQUENT ENCOUNTER: ICD-10-CM

## 2021-03-09 DIAGNOSIS — Z98.1 HX OF FUSION OF CERVICAL SPINE: ICD-10-CM

## 2021-03-09 DIAGNOSIS — M19.171 POST-TRAUMATIC OSTEOARTHRITIS OF RIGHT ANKLE: ICD-10-CM

## 2021-03-09 PROCEDURE — 97140 MANUAL THERAPY 1/> REGIONS: CPT | Mod: GP | Performed by: PHYSICAL THERAPY ASSISTANT

## 2021-03-09 PROCEDURE — 99212 OFFICE O/P EST SF 10 MIN: CPT | Mod: 95 | Performed by: FAMILY MEDICINE

## 2021-03-09 NOTE — PATIENT INSTRUCTIONS
1. Sprain of anterior talofibular ligament of right ankle, subsequent encounter    2. Sprain of tibiofibular ligament of right ankle, subsequent encounter    3. Peroneal tendon tear, right, subsequent encounter    4. Post-traumatic osteoarthritis of right ankle      -Patient is following up for right ankle pain and instability due to multiple ligament tears, peroneal tendinopathy and partial tearing and posttraumatic cartilage injuries of the ankle joint  -MRI of the right ankle was reviewed today which showed anterior talofibular and anterior tibiotalar ligament tears with widening of the anterior talofibular joint.  High-grade tendinopathy and intrasubstance tear of the peroneus longus and brevis tendons.  High-grade cartilage loss of the anterior tibiotalar joint.  All questions were answered.  -Patient has numerous tears and injuries that are causing ongoing instability and pain in the ankle.  Patient still has instability episodes in the Tri-Lock brace.  -An order was placed today for a custom ankle brace  -Patient will ultimately need ankle surgery to regain stability in the joint.  However, patient is already scheduled for spinal surgery next month.  Once he has recovered from spinal surgery and is cleared by his neurosurgeon, he will call us for referral to podiatry for a surgical consult.  -Call direct clinic number [[phone]] at any time with questions or concerns.    Albert Yeo MD [facility]  [facility] [place]

## 2021-03-09 NOTE — LETTER
3/9/2021         RE: Albert Zamarripa  [address] [facility] [id number]        Dear Colleague,    Thank you for referring your patient, Albert Zamarripa, to the [facility] [place]. Please see a copy of my visit note below.    Sandro is a 40 year old who is being evaluated via a billable telephone visit.      What phone number would you like to be contacted at? [phone]  How would you like to obtain your AVS? Julissaharjana  Phone call duration: 18 minutes    ASSESSMENT & PLAN  Patient Instructions     1. Sprain of anterior talofibular ligament of right ankle, subsequent encounter    2. Sprain of tibiofibular ligament of right ankle, subsequent encounter    3. Peroneal tendon tear, right, subsequent encounter    4. Post-traumatic osteoarthritis of right ankle      -Patient is following up for right ankle pain and instability due to multiple ligament tears, peroneal tendinopathy and partial tearing and posttraumatic cartilage injuries of the ankle joint  -MRI of the right ankle was reviewed today which showed anterior talofibular and anterior tibiotalar ligament tears with widening of the anterior talofibular joint.  High-grade tendinopathy and intrasubstance tear of the peroneus longus and brevis tendons.  High-grade cartilage loss of the anterior tibiotalar joint.  All questions were answered.  -Patient has numerous tears and injuries that are causing ongoing instability and pain in the ankle.  Patient still has instability episodes in the Tri-Lock brace.  -An order was placed today for a custom ankle brace  -Patient will ultimately need ankle surgery to regain stability in the joint.  However, patient is already scheduled for spinal surgery next month.  Once he has recovered from spinal surgery and is cleared by his neurosurgeon, he will call us for referral to podiatry for a surgical consult.  -Call direct clinic number [[phone]] at any time with questions or  concerns.    Albert Yeo MD [facility]  [facility] [place]          -----    SUBJECTIVE:  Albert Zamarripa is a 40 year old male who is seen in follow-up for chronic right ankle pain and instability.They were last seen 3/2/2021.     Since their last visit reports 0% - (About the same as last time). They indicate that their current pain level is 2/10. They have tried home exercises, physical therapy (1 visit since last appointment) and casting/splinting/bracing.      The patient is seen by themselves.    Patient's past medical, surgical, social, and family histories were reviewed today and no changes are noted.    REVIEW OF SYSTEMS:  Constitutional: NEGATIVE for fever, chills, change in weight  Skin: NEGATIVE for worrisome rashes, moles or lesions  GI/: NEGATIVE for bowel or bladder changes  Neuro: NEGATIVE for weakness, dizziness or paresthesias        Independent visualization of the below image:    MR right ankle without  contrast 3/6/2021 8:45 AM     History: Ankle pain, instability suspected, neg xray; chronic pain and  instability s/p lateral ankle sprains; minimal improvement with  bracing and therapy; Sprain of anterior talofibular ligament of right  ankle, subsequent encounter; Instability of ankle joint, right     Techniques: Multiplanar multisequence imaging of the right ankle was  obtained without  administration of intra-articular or intravenous  contrast.     Comparison: Radiographs dated 1/19/2021     Findings:     MEDIAL COMPARTMENT     Tendons: Mild tenosynovitis of the tibialis posterior tendon. The  tendon is intact. The flexor digitorum and flexor hallucis longus  tendons appear unremarkable.     Ligaments: Mild thickening of the deltoid and spring ligamentous  complex, likely sequela of prior injury however, no significant tear.     LATERAL COMPARTMENT     Tendons: The peroneal tendons are highly abnormal. There is high-grade  tendinopathy and  split tear of the peroneus longus tendon just distal  to the tip of the fibula. Significant synovial proliferation of the  tendon sheath is noted surrounding the tendon the tendon reconstitutes  itself more distally. There is a high-grade split tear of the peroneal  brevis tendon. Distally the tendon appears more normal and attaches to  the base of the first metatarsal. The peroneal retinaculum is  disrupted.     Highly abnormal lateral ligamentous complex. There is a partial tear  of the syndesmotic ligaments, such as the interosseous membrane and  the anterior tibiofibular ligament with only a few fibers which appear  abnormal on MRI is increased signal within are remaining intact. The  the anterior talar fibular ligament (ATFL) is disrupted with only a  few thickened and fibers remaining far distally. The remaining tissue  appears functionally incompetent, the fibers are stripped of the  periosteum and are elongated. The fibers lost its dark appearance on  MRI indicating that the normal ligamentous structures is lost.     Fibers of the posterior talofibular ligament are thickened. However,  the appearance. The calcaneofibular ligament is attenuated, but seen.     POSTERIOR COMPARTMENT     Achilles tendon: Intact.     Plantar fascia: Normal.      Edema within the posterior process of the talus.     ANTERIOR COMPARTMENT     Tendons: Anterior extensor tendons are intact.     JOINTS     Moderate grade ankle joint effusion.     GENERAL FINDINGS     Bones: Mild edema in the anterior tibia and anterior talar dome with  associated anterior tibial articular cartilage loss. No fracture,  marrow contusion or infiltrative change. No talar dome osteochondral  lesion.     Muscles: Normal.     Tarsal tunnel: Normal.      Sinus tarsi: Edema within the sinus Tarsi.                                                                       Impression:  1. High-grade, chronic appearing injury to the lateral ankle.     A)  Partial-thickness tear of the anterior tibiofibular ligament and  partial thickness injury to the syndesmosis interosseous membrane with  disorganized residual fibers anteriorly. Intact posterior tibiofibular  ligament.  B) Near full-thickness tear of the anterior talofibular ligament with  widening of the anterior talofibular distance. The residual fibers  appear elongated, thickened with    markedly increased intrasubstance  signal.  C) High-grade tendinopathy and intrasubstance tear of the peroneus  longus and brevis tendons just distal to the tip of the fibula,  disrupted peroneal retinaculum. Tendon fibers reconstitute more  distally. Significant synovial proliferation involving the tendon  sheath just distal to the tip of the fibula.     2. High-grade articular cartilage loss of the anterior tibiotalar  articulation with associated marrow edema in the tibia centrally as  well as the talar dome medially.  3. Moderate grade joint effusion.  4. Mild sprain to the medial ligamentous complex.     JUTTA ELLERMANN, MD    Patient's conditions were thoroughly discussed during today's visit with greater than 50% of the visit spent counseling the patient with total time spent face-to-face with the patient being 18 minutes.    Albert Yeo MD, [facility]              Again, thank you for allowing me to participate in the care of your patient.        Sincerely,        Albert Yeo, MD

## 2021-03-09 NOTE — PROGRESS NOTES
Sandro is a 40 year old who is being evaluated via a billable telephone visit.      What phone number would you like to be contacted at? [phone]  How would you like to obtain your AVS? Mikie  Phone call duration: 18 minutes    ASSESSMENT & PLAN  Patient Instructions     1. Sprain of anterior talofibular ligament of right ankle, subsequent encounter    2. Sprain of tibiofibular ligament of right ankle, subsequent encounter    3. Peroneal tendon tear, right, subsequent encounter    4. Post-traumatic osteoarthritis of right ankle      -Patient is following up for right ankle pain and instability due to multiple ligament tears, peroneal tendinopathy and partial tearing and posttraumatic cartilage injuries of the ankle joint  -MRI of the right ankle was reviewed today which showed anterior talofibular and anterior tibiotalar ligament tears with widening of the anterior talofibular joint.  High-grade tendinopathy and intrasubstance tear of the peroneus longus and brevis tendons.  High-grade cartilage loss of the anterior tibiotalar joint.  All questions were answered.  -Patient has numerous tears and injuries that are causing ongoing instability and pain in the ankle.  Patient still has instability episodes in the Tri-Lock brace.  -An order was placed today for a custom ankle brace  -Patient will ultimately need ankle surgery to regain stability in the joint.  However, patient is already scheduled for spinal surgery next month.  Once he has recovered from spinal surgery and is cleared by his neurosurgeon, he will call us for referral to podiatry for a surgical consult.  -Call direct clinic number [[phone]] at any time with questions or concerns.    Albert Yeo MD [facility]  [facility] [place]          -----    SUBJECTIVE:  Albert Zamarripa is a 40 year old male who is seen in follow-up for chronic right ankle pain and instability.They were last seen 3/2/2021.      Since their last visit reports 0% - (About the same as last time). They indicate that their current pain level is 2/10. They have tried home exercises, physical therapy (1 visit since last appointment) and casting/splinting/bracing.      The patient is seen by themselves.    Patient's past medical, surgical, social, and family histories were reviewed today and no changes are noted.    REVIEW OF SYSTEMS:  Constitutional: NEGATIVE for fever, chills, change in weight  Skin: NEGATIVE for worrisome rashes, moles or lesions  GI/: NEGATIVE for bowel or bladder changes  Neuro: NEGATIVE for weakness, dizziness or paresthesias        Independent visualization of the below image:    MR right ankle without  contrast 3/6/2021 8:45 AM     History: Ankle pain, instability suspected, neg xray; chronic pain and  instability s/p lateral ankle sprains; minimal improvement with  bracing and therapy; Sprain of anterior talofibular ligament of right  ankle, subsequent encounter; Instability of ankle joint, right     Techniques: Multiplanar multisequence imaging of the right ankle was  obtained without  administration of intra-articular or intravenous  contrast.     Comparison: Radiographs dated 1/19/2021     Findings:     MEDIAL COMPARTMENT     Tendons: Mild tenosynovitis of the tibialis posterior tendon. The  tendon is intact. The flexor digitorum and flexor hallucis longus  tendons appear unremarkable.     Ligaments: Mild thickening of the deltoid and spring ligamentous  complex, likely sequela of prior injury however, no significant tear.     LATERAL COMPARTMENT     Tendons: The peroneal tendons are highly abnormal. There is high-grade  tendinopathy and split tear of the peroneus longus tendon just distal  to the tip of the fibula. Significant synovial proliferation of the  tendon sheath is noted surrounding the tendon the tendon reconstitutes  itself more distally. There is a high-grade split tear of the peroneal  brevis  tendon. Distally the tendon appears more normal and attaches to  the base of the first metatarsal. The peroneal retinaculum is  disrupted.     Highly abnormal lateral ligamentous complex. There is a partial tear  of the syndesmotic ligaments, such as the interosseous membrane and  the anterior tibiofibular ligament with only a few fibers which appear  abnormal on MRI is increased signal within are remaining intact. The  the anterior talar fibular ligament (ATFL) is disrupted with only a  few thickened and fibers remaining far distally. The remaining tissue  appears functionally incompetent, the fibers are stripped of the  periosteum and are elongated. The fibers lost its dark appearance on  MRI indicating that the normal ligamentous structures is lost.     Fibers of the posterior talofibular ligament are thickened. However,  the appearance. The calcaneofibular ligament is attenuated, but seen.     POSTERIOR COMPARTMENT     Achilles tendon: Intact.     Plantar fascia: Normal.      Edema within the posterior process of the talus.     ANTERIOR COMPARTMENT     Tendons: Anterior extensor tendons are intact.     JOINTS     Moderate grade ankle joint effusion.     GENERAL FINDINGS     Bones: Mild edema in the anterior tibia and anterior talar dome with  associated anterior tibial articular cartilage loss. No fracture,  marrow contusion or infiltrative change. No talar dome osteochondral  lesion.     Muscles: Normal.     Tarsal tunnel: Normal.      Sinus tarsi: Edema within the sinus Tarsi.                                                                       Impression:  1. High-grade, chronic appearing injury to the lateral ankle.     A) Partial-thickness tear of the anterior tibiofibular ligament and  partial thickness injury to the syndesmosis interosseous membrane with  disorganized residual fibers anteriorly. Intact posterior tibiofibular  ligament.  B) Near full-thickness tear of the anterior talofibular ligament  with  widening of the anterior talofibular distance. The residual fibers  appear elongated, thickened with    markedly increased intrasubstance  signal.  C) High-grade tendinopathy and intrasubstance tear of the peroneus  longus and brevis tendons just distal to the tip of the fibula,  disrupted peroneal retinaculum. Tendon fibers reconstitute more  distally. Significant synovial proliferation involving the tendon  sheath just distal to the tip of the fibula.     2. High-grade articular cartilage loss of the anterior tibiotalar  articulation with associated marrow edema in the tibia centrally as  well as the talar dome medially.  3. Moderate grade joint effusion.  4. Mild sprain to the medial ligamentous complex.     JUTTA ELLERMANN, MD    Patient's conditions were thoroughly discussed during today's visit with greater than 50% of the visit spent counseling the patient with total time spent face-to-face with the patient being 18 minutes.    Albert Yeo MD, [facility]

## 2021-03-12 ENCOUNTER — THERAPY VISIT (OUTPATIENT)
Dept: PHYSICAL THERAPY | Facility: CLINIC | Age: 41
End: 2021-03-12
Payer: COMMERCIAL

## 2021-03-12 DIAGNOSIS — Z98.1 HISTORY OF THORACIC SPINAL FUSION: ICD-10-CM

## 2021-03-12 DIAGNOSIS — Z98.1 HX OF FUSION OF CERVICAL SPINE: ICD-10-CM

## 2021-03-12 PROCEDURE — 97140 MANUAL THERAPY 1/> REGIONS: CPT | Mod: GP | Performed by: PHYSICAL THERAPIST

## 2021-03-12 PROCEDURE — 97110 THERAPEUTIC EXERCISES: CPT | Mod: GP | Performed by: PHYSICAL THERAPIST

## 2021-03-16 ENCOUNTER — THERAPY VISIT (OUTPATIENT)
Dept: OCCUPATIONAL THERAPY | Facility: CLINIC | Age: 41
End: 2021-03-16
Payer: COMMERCIAL

## 2021-03-16 ENCOUNTER — THERAPY VISIT (OUTPATIENT)
Dept: PHYSICAL THERAPY | Facility: CLINIC | Age: 41
End: 2021-03-16
Payer: COMMERCIAL

## 2021-03-16 DIAGNOSIS — R20.2 PARESTHESIA: ICD-10-CM

## 2021-03-16 DIAGNOSIS — M47.814 THORACIC SPONDYLOSIS: Primary | ICD-10-CM

## 2021-03-16 DIAGNOSIS — R29.898 HAND WEAKNESS: ICD-10-CM

## 2021-03-16 PROCEDURE — 97110 THERAPEUTIC EXERCISES: CPT | Mod: GO | Performed by: OCCUPATIONAL THERAPIST

## 2021-03-16 PROCEDURE — 97010 HOT OR COLD PACKS THERAPY: CPT | Mod: GP | Performed by: PHYSICAL THERAPY ASSISTANT

## 2021-03-16 PROCEDURE — 97112 NEUROMUSCULAR REEDUCATION: CPT | Mod: GO | Performed by: OCCUPATIONAL THERAPIST

## 2021-03-16 PROCEDURE — 97140 MANUAL THERAPY 1/> REGIONS: CPT | Mod: GP | Performed by: PHYSICAL THERAPY ASSISTANT
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
## 2021-03-17 NOTE — PROGRESS NOTES
PROGRESS  REPORT    Progress reporting period is from 1/21/21 to 2/16/21.       SUBJECTIVE  Subjective changes noted by patient:  .  Subjective: having trouble using NMES unit--doesn't seem to be giving good muscle contraction. Feels he is making some progress overall with strength, arm strength is slow. More issues with back and leg numbness with increased activity such as pushing (grocercy carts), lifting.  Current pain level is 2/10  .     Previous pain level was  3/10  .   Changes in function:  Yes (See Goal flowsheet attached for changes in current functional level)  Adverse reaction to treatment or activity: None    OBJECTIVE  Changes noted in objective findings:  The objective findings below are from DOS 2-16-21.  Objective: SPADI: 43; discussed decreasing frequency of PT as patient is utilizing gym more regularly; R triceps=3-/5; now able to hold 1# overhead; attempted to position pads for NMES--unable to get effective contraction; will reach out to other providers to discuss parameters/unit use to determine best format for achieving good contraction.      ASSESSMENT/PLAN  Updated problem list and treatment plan: Diagnosis 1:  S/p cervical/thoracic fusion  Pain -  electric stimulation, self management, education and home program  Decreased ROM/flexibility - manual therapy and therapeutic exercise  Decreased strength - therapeutic exercise and therapeutic activities  Decreased function - therapeutic activities  STG/LTGs have been met or progress has been made towards goals:  Yes (See Goal flow sheet completed today.)  Assessment of Progress: The patient's condition is improving.  Self Management Plans:  Patient is independent in a home treatment program.  Patient is independent in self management of symptoms.  I have re-evaluated this patient and find that the nature, scope, duration and intensity of the therapy is appropriate for the medical condition of the patient.  Albert continues to require the  following intervention to meet STG and LTG's:  PT    Recommendations:  This patient would benefit from continued therapy.     Frequency:  1 X week, once daily  Duration:  for 8 weeks        Please refer to the daily flowsheet for treatment today, total treatment time and time spent performing 1:1 timed codes.

## 2021-03-23 ENCOUNTER — THERAPY VISIT (OUTPATIENT)
Dept: PHYSICAL THERAPY | Facility: CLINIC | Age: 41
End: 2021-03-23
Payer: COMMERCIAL

## 2021-03-23 DIAGNOSIS — Z98.1 HISTORY OF THORACIC SPINAL FUSION: ICD-10-CM

## 2021-03-23 DIAGNOSIS — Z98.1 HX OF FUSION OF CERVICAL SPINE: ICD-10-CM

## 2021-03-23 PROCEDURE — 97530 THERAPEUTIC ACTIVITIES: CPT | Mod: GP | Performed by: PHYSICAL THERAPIST

## 2021-03-23 PROCEDURE — 97110 THERAPEUTIC EXERCISES: CPT | Mod: GP | Performed by: PHYSICAL THERAPIST

## 2021-03-23 NOTE — PROGRESS NOTES
DISCHARGE REPORT    Progress reporting period is from 1-21-21 to 3-32-21.       SUBJECTIVE  Subjective changes noted by patient: Sandro reports he is scheduled for surgery 4/26/21 for a revision of his previous cervical/thoracic fusion with correction of developing kyphosis.  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  Is also scheduled for ankle reconstruction in September 2021.  Overall is managing with a robust home program and use of gym.       Changes in function:  Yes (See Goal flowsheet attached for changes in current functional level)  Adverse reaction to treatment or activity: None    OBJECTIVE  Changes noted in objective findings:  The objective findings below are from DOS 3-23-21.  R Triceps=3-/5, previous SPADI=43; Lumbar ROM is wnl; core strength is good; glut med R=4/5, R glut max=4+/5 back extensors=4/5.     ASSESSMENT/PLAN  Updated problem list and treatment plan: Diagnosis 1:  S/p cervical/thoracic fusion  Pain -  electric stimulation, manual therapy, self management, education, directional preference exercise and home program  Decreased ROM/flexibility - manual therapy and therapeutic exercise  Decreased joint mobility - manual therapy and therapeutic exercise  Decreased strength - therapeutic exercise and therapeutic activities  Impaired muscle performance - neuro re-education  Decreased function - therapeutic activities  Impaired posture - neuro re-education  STG/LTGs have been met or progress has been made towards goals:  Achieving STG's; LTG is not achievable due to denervation.  Assessment of Progress: Patient is meeting short term goals and is progressing towards long term goals.  Self Management Plans:  Patient is independent in a home treatment program.  Patient is independent in self management of symptoms.  I have re-evaluated this patient and find  that the nature, scope, duration and intensity of the therapy is appropriate for the medical condition of the patient.  Albert continues to require the following intervention to meet STG and LTG's:  PT intervention is no longer required to meet STG/LTG.    Recommendations:  This patient is ready to be discharged from therapy and continue their home treatment program.    Please refer to the daily flowsheet for treatment today, total treatment time and time spent performing 1:1 timed codes.

## 2021-04-06 ENCOUNTER — THERAPY VISIT (OUTPATIENT)
Dept: OCCUPATIONAL THERAPY | Facility: CLINIC | Age: 41
End: 2021-04-06
Payer: COMMERCIAL

## 2021-04-06 DIAGNOSIS — R29.898 HAND WEAKNESS: ICD-10-CM

## 2021-04-06 DIAGNOSIS — R20.2 PARESTHESIA: ICD-10-CM

## 2021-04-06 PROCEDURE — 97110 THERAPEUTIC EXERCISES: CPT | Mod: GO | Performed by: OCCUPATIONAL THERAPIST
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
## 2021-04-20 ENCOUNTER — THERAPY VISIT (OUTPATIENT)
Dept: OCCUPATIONAL THERAPY | Facility: CLINIC | Age: 41
End: 2021-04-20
Payer: COMMERCIAL

## 2021-04-20 DIAGNOSIS — R20.2 PARESTHESIA: ICD-10-CM

## 2021-04-20 DIAGNOSIS — R29.898 HAND WEAKNESS: ICD-10-CM

## 2021-04-20 PROCEDURE — 97112 NEUROMUSCULAR REEDUCATION: CPT | Mod: GO | Performed by: OCCUPATIONAL THERAPIST

## 2021-04-20 PROCEDURE — 97110 THERAPEUTIC EXERCISES: CPT | Mod: GO | Performed by: OCCUPATIONAL THERAPIST
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
## 2021-04-28 ENCOUNTER — MYC MEDICAL ADVICE (OUTPATIENT)
Dept: INTERNAL MEDICINE | Facility: CLINIC | Age: 41
End: 2021-04-28

## 2021-04-28 DIAGNOSIS — Z86.79 HISTORY OF ATRIAL FIBRILLATION: Primary | ICD-10-CM

## 2021-04-28 RX ORDER — LOSARTAN POTASSIUM AND HYDROCHLOROTHIAZIDE 12.5; 5 MG/1; MG/1
1 TABLET ORAL DAILY
Qty: 90 TABLET | Refills: 1 | Status: SHIPPED | OUTPATIENT
Start: 2021-04-28 | End: 2021-05-13

## 2021-04-28 NOTE — TELEPHONE ENCOUNTER
Refill request of Lyrica and Losartan.   Pt should have a refill of the Lyrica Rxs left.     Sent message back.     Last VV on 3/4/21    BP Readings from Last 3 Encounters:   03/02/21 122/70   01/29/21 120/74   01/19/21 132/78

## 2021-05-13 ENCOUNTER — OFFICE VISIT (OUTPATIENT)
Dept: INTERNAL MEDICINE | Facility: CLINIC | Age: 41
End: 2021-05-13
Payer: COMMERCIAL

## 2021-05-13 VITALS
TEMPERATURE: 97.8 F | HEART RATE: 90 BPM | BODY MASS INDEX: 29.52 KG/M2 | OXYGEN SATURATION: 98 % | HEIGHT: 74 IN | WEIGHT: 230 LBS | DIASTOLIC BLOOD PRESSURE: 74 MMHG | SYSTOLIC BLOOD PRESSURE: 128 MMHG | RESPIRATION RATE: 16 BRPM

## 2021-05-13 DIAGNOSIS — G47.33 OSA (OBSTRUCTIVE SLEEP APNEA): ICD-10-CM

## 2021-05-13 DIAGNOSIS — R41.89 COGNITIVE DEFICITS: Primary | ICD-10-CM

## 2021-05-13 DIAGNOSIS — I10 ESSENTIAL HYPERTENSION: ICD-10-CM

## 2021-05-13 DIAGNOSIS — F41.9 ANXIETY: ICD-10-CM

## 2021-05-13 PROCEDURE — 99214 OFFICE O/P EST MOD 30 MIN: CPT | Performed by: INTERNAL MEDICINE

## 2021-05-13 RX ORDER — TRAMADOL HYDROCHLORIDE 50 MG/1
50 TABLET ORAL EVERY 6 HOURS PRN
COMMUNITY
End: 2021-06-18

## 2021-05-13 RX ORDER — LOSARTAN POTASSIUM AND HYDROCHLOROTHIAZIDE 12.5; 5 MG/1; MG/1
1 TABLET ORAL DAILY
Qty: 90 TABLET | Refills: 1 | Status: SHIPPED | OUTPATIENT
Start: 2021-10-15

## 2021-05-13 RX ORDER — DULOXETIN HYDROCHLORIDE 30 MG/1
30 CAPSULE, DELAYED RELEASE ORAL DAILY
Qty: 90 CAPSULE | Refills: 3 | Status: SHIPPED | OUTPATIENT
Start: 2021-05-13

## 2021-05-13 ASSESSMENT — ENCOUNTER SYMPTOMS
FATIGUE: 0
SHORTNESS OF BREATH: 0
PALPITATIONS: 0

## 2021-05-13 ASSESSMENT — MIFFLIN-ST. JEOR: SCORE: 2013.02
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
## 2021-05-13 NOTE — PATIENT INSTRUCTIONS
Patient Education     Obstructive Sleep Apnea  Obstructive sleep apnea is a condition that causes your air passages to become narrowed or blocked during sleep. As a result, breathing stops for short periods. Your body wakes up enough for breathing to start again, though you don't remember it. The cycle of stopped breathing and brief awakenings can repeat dozens of times a night. This prevents the body from getting to the deeper stages of sleep that are needed for good rest and may cause your body's oxygen level to fall.   Signs of sleep apnea include loud snoring, noisy breathing, and gasping sounds during sleep. Daytime symptoms include waking up tired after a full night's sleep, waking up with headaches, feeling very sleepy or falling asleep during the day, and having problems with memory or concentration.   Risk factors for sleep apnea include:    Being overweight    Being a man, or a woman in menopause    Smoking    Using alcohol or sedating medicines    Having enlarged structures in the nose or throat such as enlarged tonsils or adenoids, or extra tissue in the airway  Home care  Lifestyle changes that can help treat snoring and sleep apnea include the following:    If you are overweight, lose weight. Talk with your healthcare provider about a weight-loss plan for you.    Don't drink alcohol for 3 to 4 hours before bedtime. Don't take sedating medicines. Ask your healthcare provider about the medicines you take.    If you smoke, talk to your healthcare provider about ways to quit. It's important to stay away from secondhand smoke. don't use e-cigarettes because of their harmful side effects.    Sleep on your side. This can help prevent gravity from pulling relaxed throat tissues into your breathing passages.    If you have allergies or sinus problems that block your nose, ask your healthcare provider for help.    Use positive airway pressure (PAP): Discuss with your healthcare provider the benefits of using PAP  at home and the type of PAP that is best for you.  Follow-up care  Follow up with your healthcare provider, or as advised. A diagnosis of sleep apnea is made with a sleep study. Your healthcare provider can tell you more about this test.   When to seek medical advice  See your healthcare provider if you have daytime symptoms of sleep apnea. These include:     Waking up tired after a full night's sleep    Waking up with a headache    Feeling very sleepy or falling asleep during the day    Having problems with memory or concentration  Also talk with your provider if your partner tells you that you snore, gasp for air, or stop breathing while you sleep.   Seeing your provider is important because sleep apnea can make you more likely to have certain health problems. These include high blood pressure, heart attack, stroke, and sexual dysfunction. If you have sleep apnea, talk with your healthcare provider about the best treatments for you.   Gwen last reviewed this educational content on 9/1/2019 2000-2021 [employer]. All rights reserved. This information is not intended as a substitute for professional medical care. Always follow your healthcare professional's instructions.

## 2021-05-18 ENCOUNTER — THERAPY VISIT (OUTPATIENT)
Dept: OCCUPATIONAL THERAPY | Facility: CLINIC | Age: 41
End: 2021-05-18
Payer: COMMERCIAL

## 2021-05-18 DIAGNOSIS — R20.2 PARESTHESIA: ICD-10-CM

## 2021-05-18 DIAGNOSIS — R29.898 HAND WEAKNESS: ICD-10-CM

## 2021-05-18 PROCEDURE — 97110 THERAPEUTIC EXERCISES: CPT | Mod: GO | Performed by: OCCUPATIONAL THERAPIST
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
## 2021-06-02 ENCOUNTER — TRANSFERRED RECORDS (OUTPATIENT)
Dept: HEALTH INFORMATION MANAGEMENT | Facility: CLINIC | Age: 41
End: 2021-06-02

## 2021-06-02 ENCOUNTER — MYC MEDICAL ADVICE (OUTPATIENT)
Dept: INTERNAL MEDICINE | Facility: CLINIC | Age: 41
End: 2021-06-02

## 2021-06-02 DIAGNOSIS — S14.109D CERVICAL SPINAL CORD INJURY, SUBSEQUENT ENCOUNTER (H): Primary | ICD-10-CM

## 2021-06-02 DIAGNOSIS — R29.898 HAND WEAKNESS: ICD-10-CM

## 2021-06-02 DIAGNOSIS — R20.2 PARESTHESIA: ICD-10-CM

## 2021-06-02 RX ORDER — PREGABALIN 200 MG/1
200 CAPSULE ORAL 2 TIMES DAILY
Qty: 60 CAPSULE | Refills: 1 | Status: SHIPPED | OUTPATIENT
Start: 2021-06-02 | End: 2021-07-19

## 2021-06-02 RX ORDER — PREGABALIN 300 MG/1
300 CAPSULE ORAL
COMMUNITY
Start: 2020-08-01 | End: 2021-06-02 | Stop reason: DRUGHIGH

## 2021-06-02 NOTE — TELEPHONE ENCOUNTER
It is better to slowly taper.  Since you are taking 300 mg I will reduce it to 200 mg twice a day.  New prescription sent it for a month.  The next prescription dose can be adjusted based on how you respond.    Francisco Enriquez MD

## 2021-06-04 ENCOUNTER — TRANSFERRED RECORDS (OUTPATIENT)
Dept: HEALTH INFORMATION MANAGEMENT | Facility: CLINIC | Age: 41
End: 2021-06-04

## 2021-06-04 ENCOUNTER — MEDICAL CORRESPONDENCE (OUTPATIENT)
Dept: HEALTH INFORMATION MANAGEMENT | Facility: CLINIC | Age: 41
End: 2021-06-04

## 2021-06-04 ENCOUNTER — HOSPITAL ENCOUNTER (OUTPATIENT)
Dept: LAB | Facility: CLINIC | Age: 41
Discharge: HOME | End: 2021-06-04
Attending: PSYCHIATRY & NEUROLOGY | Admitting: PSYCHIATRY & NEUROLOGY
Payer: COMMERCIAL

## 2021-06-04 DIAGNOSIS — S06.9XAA BRAIN INJURY (H): ICD-10-CM

## 2021-06-04 DIAGNOSIS — E53.8 VITAMIN B12 DEFICIENCY (NON ANEMIC): ICD-10-CM

## 2021-06-04 DIAGNOSIS — R41.89 DECREASED CONSCIOUSNESS: Primary | ICD-10-CM

## 2021-06-04 LAB — VIT B12 SERPL-MCNC: 330 PG/ML (ref 193–986)

## 2021-06-04 PROCEDURE — 36415 COLL VENOUS BLD VENIPUNCTURE: CPT | Performed by: PSYCHIATRY & NEUROLOGY

## 2021-06-04 PROCEDURE — 82607 VITAMIN B-12: CPT | Performed by: PSYCHIATRY & NEUROLOGY

## 2021-06-17 ENCOUNTER — THERAPY VISIT (OUTPATIENT)
Dept: OCCUPATIONAL THERAPY | Facility: CLINIC | Age: 41
End: 2021-06-17
Payer: COMMERCIAL

## 2021-06-17 DIAGNOSIS — R20.2 PARESTHESIA: ICD-10-CM

## 2021-06-17 DIAGNOSIS — R29.898 HAND WEAKNESS: ICD-10-CM

## 2021-06-17 PROCEDURE — 97763 ORTHC/PROSTC MGMT SBSQ ENC: CPT | Mod: GO | Performed by: OCCUPATIONAL THERAPIST

## 2021-06-18 VITALS — HEIGHT: 74 IN | WEIGHT: 230 LBS | BODY MASS INDEX: 29.52 KG/M2

## 2021-06-18 RX ORDER — AMOXICILLIN 250 MG
1 CAPSULE ORAL
COMMUNITY
Start: 2021-04-30 | End: 2021-07-19

## 2021-06-18 ASSESSMENT — MIFFLIN-ST. JEOR: SCORE: 2008.02

## 2021-06-18 NOTE — PATIENT INSTRUCTIONS
Your BMI is Body mass index is 29.53 kg/m .  Weight management is a personal decision.  If you are interested in exploring weight loss strategies, the following discussion covers the approaches that may be successful. Body mass index (BMI) is one way to tell whether you are at a healthy weight, overweight, or obese. It measures your weight in relation to your height.  A BMI of 18.5 to 24.9 is in the healthy range. A person with a BMI of 25 to 29.9 is considered overweight, and someone with a BMI of 30 or greater is considered obese. More than two-thirds of American adults are considered overweight or obese.  Being overweight or obese increases the risk for further weight gain. Excess weight may lead to heart disease and diabetes.  Creating and following plans for healthy eating and physical activity may help you improve your health.  Weight control is part of healthy lifestyle and includes exercise, emotional health, and healthy eating habits. Careful eating habits lifelong are the mainstay of weight control. Though there are significant health benefits from weight loss, long-term weight loss with diet alone may be very difficult to achieve- studies show long-term success with dietary management in less than 10% of people. Attaining a healthy weight may be especially difficult to achieve in those with severe obesity. In some cases, medications, devices and surgical management might be considered.  What can you do?  If you are overweight or obese and are interested in methods for weight loss, you should discuss this with your provider.     Consider reducing daily calorie intake by 500 calories.     Keep a food journal.     Avoiding skipping meals, consider cutting portions instead.    Diet combined with exercise helps maintain muscle while optimizing fat loss. Strength training is particularly important for building and maintaining muscle mass. Exercise helps reduce stress, increase energy, and improves fitness.  Increasing exercise without diet control, however, may not burn enough calories to loose weight.       Start walking three days a week 10-20 minutes at a time    Work towards walking thirty minutes five days a week     Eventually, increase the speed of your walking for 1-2 minutes at time    In addition, we recommend that you review healthy lifestyles and methods for weight loss available through the National Institutes of Health patient information sites:  http://win.niddk.nih.gov/publications/index.htm    And look into health and wellness programs that may be available through your health insurance provider, employer, local community center, or ana club.    Weight management plan: Patient was referred to their PCP to discuss a diet and exercise plan.

## 2021-06-22 ENCOUNTER — VIRTUAL VISIT (OUTPATIENT)
Dept: SLEEP MEDICINE | Facility: CLINIC | Age: 41
End: 2021-06-22
Attending: INTERNAL MEDICINE
Payer: COMMERCIAL

## 2021-06-22 DIAGNOSIS — F51.04 PSYCHOPHYSIOLOGICAL INSOMNIA: Primary | ICD-10-CM

## 2021-06-22 DIAGNOSIS — G47.33 OSA (OBSTRUCTIVE SLEEP APNEA): ICD-10-CM

## 2021-06-22 PROBLEM — I10 ESSENTIAL HYPERTENSION: Status: ACTIVE | Noted: 2021-04-26

## 2021-06-22 PROBLEM — M48.50XA SPINAL COMPRESSION FRACTURE (H): Status: ACTIVE | Noted: 2020-07-31

## 2021-06-22 PROCEDURE — 99205 OFFICE O/P NEW HI 60 MIN: CPT | Mod: 95 | Performed by: INTERNAL MEDICINE

## 2021-06-22 RX ORDER — ZOLPIDEM TARTRATE 10 MG/1
TABLET ORAL
Qty: 1 TABLET | Refills: 0 | Status: SHIPPED | OUTPATIENT
Start: 2021-06-22 | End: 2021-07-19
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
## 2021-06-29 ENCOUNTER — THERAPY VISIT (OUTPATIENT)
Dept: OCCUPATIONAL THERAPY | Facility: CLINIC | Age: 41
End: 2021-06-29
Payer: COMMERCIAL

## 2021-06-29 DIAGNOSIS — R20.2 PARESTHESIA: ICD-10-CM

## 2021-06-29 DIAGNOSIS — R29.898 HAND WEAKNESS: ICD-10-CM

## 2021-06-29 PROCEDURE — 97110 THERAPEUTIC EXERCISES: CPT | Mod: GO | Performed by: OCCUPATIONAL THERAPIST
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
## 2021-07-05 ENCOUNTER — MYC MEDICAL ADVICE (OUTPATIENT)
Dept: INTERNAL MEDICINE | Facility: CLINIC | Age: 41
End: 2021-07-05

## 2021-07-06 ENCOUNTER — THERAPY VISIT (OUTPATIENT)
Dept: OCCUPATIONAL THERAPY | Facility: CLINIC | Age: 41
End: 2021-07-06
Payer: COMMERCIAL

## 2021-07-06 DIAGNOSIS — R20.2 PARESTHESIA: ICD-10-CM

## 2021-07-06 DIAGNOSIS — R29.898 HAND WEAKNESS: Primary | ICD-10-CM

## 2021-07-06 PROCEDURE — 97763 ORTHC/PROSTC MGMT SBSQ ENC: CPT | Mod: GO | Performed by: OCCUPATIONAL THERAPIST

## 2021-07-06 PROCEDURE — 97110 THERAPEUTIC EXERCISES: CPT | Mod: GO | Performed by: OCCUPATIONAL THERAPIST

## 2021-07-06 NOTE — TELEPHONE ENCOUNTER
As I have not seen this patient I cannot comment on the Lyrica, patient should establish with a new provider and discuss about tapering Lyrica down

## 2021-07-19 ENCOUNTER — OFFICE VISIT (OUTPATIENT)
Dept: INTERNAL MEDICINE | Facility: CLINIC | Age: 41
End: 2021-07-19
Payer: COMMERCIAL

## 2021-07-19 VITALS
TEMPERATURE: 97.9 F | BODY MASS INDEX: 30.22 KG/M2 | DIASTOLIC BLOOD PRESSURE: 81 MMHG | WEIGHT: 235.5 LBS | RESPIRATION RATE: 18 BRPM | HEIGHT: 74 IN | SYSTOLIC BLOOD PRESSURE: 120 MMHG | HEART RATE: 78 BPM | OXYGEN SATURATION: 95 %

## 2021-07-19 DIAGNOSIS — R29.898 HAND WEAKNESS: ICD-10-CM

## 2021-07-19 DIAGNOSIS — R20.2 PARESTHESIA: ICD-10-CM

## 2021-07-19 DIAGNOSIS — S14.109D CERVICAL SPINAL CORD INJURY, SUBSEQUENT ENCOUNTER (H): ICD-10-CM

## 2021-07-19 PROCEDURE — 99214 OFFICE O/P EST MOD 30 MIN: CPT | Performed by: NURSE PRACTITIONER

## 2021-07-19 RX ORDER — PREGABALIN 150 MG/1
150 CAPSULE ORAL 2 TIMES DAILY
Qty: 60 CAPSULE | Refills: 1 | Status: SHIPPED | OUTPATIENT
Start: 2021-07-19

## 2021-07-19 ASSESSMENT — MIFFLIN-ST. JEOR: SCORE: 2032.97

## 2021-07-19 NOTE — PATIENT INSTRUCTIONS
Will cut back on Pregablin from 200 mg to 150 mg twice daily; will notify me by MY CHART when you want to decrease down more.    Follow up on 2/2022 for physical and fasting labs
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
## 2021-08-10 ENCOUNTER — THERAPY VISIT (OUTPATIENT)
Dept: OCCUPATIONAL THERAPY | Facility: CLINIC | Age: 41
End: 2021-08-10
Payer: COMMERCIAL

## 2021-08-10 DIAGNOSIS — R20.2 PARESTHESIA: ICD-10-CM

## 2021-08-10 DIAGNOSIS — R29.898 HAND WEAKNESS: ICD-10-CM

## 2021-08-10 PROCEDURE — 97110 THERAPEUTIC EXERCISES: CPT | Mod: GO | Performed by: OCCUPATIONAL THERAPIST

## 2021-08-10 PROCEDURE — 97140 MANUAL THERAPY 1/> REGIONS: CPT | Mod: GO | Performed by: OCCUPATIONAL THERAPIST
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
## 2021-08-19 PROBLEM — S93.401A INVERSION SPRAIN OF RIGHT ANKLE: Status: RESOLVED | Noted: 2021-01-21 | Resolved: 2021-08-19

## 2021-08-19 NOTE — PROGRESS NOTES
DISCHARGE REPORT    Progress reporting period is from 1-21-21 to 2-23-21.       SUBJECTIVE  Subjective changes noted by patient:  .  Subjective: Reports wearing Trilock brace without any instances of rolling his ankle--does have some pain with prolonged amublation     Current pain level is 2/10  .     Previous pain level was  3/10  .   Changes in function:  Yes (See Goal flowsheet attached for changes in current functional level)  Adverse reaction to treatment or activity: None    OBJECTIVE  Changes noted in objective findings:  The objective findings below are from DOS 2-23-21.  Objective: ankle ROM is wnl; decr control during step down with R leg leading down--Gasgroc R=4/5, decr SLS control/endurance.      ASSESSMENT/PLAN  Updated problem list and treatment plan: Diagnosis 1:  R ankle sprain  Pain -  splint/taping/bracing/orthotics, self management, education and home program  Decreased ROM/flexibility - manual therapy and therapeutic exercise  Decreased strength - therapeutic exercise and therapeutic activities  Decreased function - therapeutic activities  Instability -  Therapeutic Activity  Therapeutic Exercise  STG/LTGs have been met or progress has been made towards goals:  Yes (See Goal flow sheet completed today.)  Assessment of Progress: The patient has not returned to therapy. Current status is unknown.  Self Management Plans:  Patient is independent in a home treatment program.  Patient is independent in self management of symptoms.  I have re-evaluated this patient and find that the nature, scope, duration and intensity of the therapy is appropriate for the medical condition of the patient.  Albert continues to require the following intervention to meet STG and LTG's:  PT intervention is no longer required to meet STG/LTG.    Recommendations:  This patient is ready to be discharged from therapy and continue their home treatment program.    Please refer to the daily flowsheet for treatment today, total  treatment time and time spent performing 1:1 timed codes.

## 2021-08-27 ENCOUNTER — TELEPHONE (OUTPATIENT)
Dept: SLEEP MEDICINE | Facility: CLINIC | Age: 41
End: 2021-08-27

## 2021-08-27 NOTE — TELEPHONE ENCOUNTER
Reason for call:  Other   Patient called regarding (reason for call): appointment  Additional comments: CANCEL STUDY     Phone number to reach patient:  Home number on file [phone] (home)    Best Time:  ANY    Can we leave a detailed message on this number?  YES    Travel screening: Not Applicable

## 2021-09-03 ENCOUNTER — E-VISIT (OUTPATIENT)
Dept: URGENT CARE | Facility: CLINIC | Age: 41
End: 2021-09-03
Payer: COMMERCIAL

## 2021-09-03 DIAGNOSIS — J20.9 ACUTE BRONCHITIS WITH COEXISTING CONDITION REQUIRING PROPHYLACTIC TREATMENT: Primary | ICD-10-CM

## 2021-09-03 PROCEDURE — 99207 PR NO BILLABLE SERVICE THIS VISIT: CPT | Performed by: PHYSICIAN ASSISTANT

## 2021-09-03 RX ORDER — ALBUTEROL SULFATE 90 UG/1
2 AEROSOL, METERED RESPIRATORY (INHALATION) EVERY 4 HOURS PRN
Qty: 18 G | Refills: 0 | Status: SHIPPED | OUTPATIENT
Start: 2021-09-03

## 2021-09-03 RX ORDER — AZITHROMYCIN 250 MG/1
TABLET, FILM COATED ORAL
Qty: 6 TABLET | Refills: 0 | Status: SHIPPED | OUTPATIENT
Start: 2021-09-03 | End: 2021-09-08

## 2021-09-03 RX ORDER — BENZONATATE 200 MG/1
200 CAPSULE ORAL 3 TIMES DAILY PRN
Qty: 21 CAPSULE | Refills: 0 | Status: SHIPPED | OUTPATIENT
Start: 2021-09-03

## 2021-10-11 ENCOUNTER — HEALTH MAINTENANCE LETTER (OUTPATIENT)
Age: 41
End: 2021-10-11

## 2022-01-13 PROBLEM — R29.898 HAND WEAKNESS: Status: RESOLVED | Noted: 2020-12-23 | Resolved: 2022-01-13

## 2022-01-13 PROBLEM — R20.2 PARESTHESIA: Status: RESOLVED | Noted: 2020-12-23 | Resolved: 2022-01-13

## 2022-03-08 DIAGNOSIS — S14.109D CERVICAL SPINAL CORD INJURY, SUBSEQUENT ENCOUNTER (H): ICD-10-CM

## 2022-03-08 DIAGNOSIS — R29.898 HAND WEAKNESS: ICD-10-CM

## 2022-03-08 DIAGNOSIS — Z98.1 HX OF FUSION OF CERVICAL SPINE: ICD-10-CM

## 2022-03-08 DIAGNOSIS — R20.2 PARESTHESIA: ICD-10-CM

## 2022-03-09 NOTE — TELEPHONE ENCOUNTER
Routing refill request to provider for review/approval because:  Drug not on the [facility] refill protocol   Patient needs to be seen because:  due for appointment around 2/16/22 per last office visit with Fabio.   Needs to establish care with another provider.     Routed to provider covering the letter S for Fabio.

## 2022-03-10 RX ORDER — PREGABALIN 200 MG/1
CAPSULE ORAL
Qty: 60 CAPSULE | OUTPATIENT
Start: 2022-03-10

## 2022-03-10 RX ORDER — PREGABALIN 300 MG/1
CAPSULE ORAL
Qty: 180 CAPSULE | OUTPATIENT
Start: 2022-03-10

## 2022-03-27 ENCOUNTER — HEALTH MAINTENANCE LETTER (OUTPATIENT)
Age: 42
End: 2022-03-27

## 2022-09-24 ENCOUNTER — HEALTH MAINTENANCE LETTER (OUTPATIENT)
Age: 42
End: 2022-09-24

## 2023-05-08 ENCOUNTER — HEALTH MAINTENANCE LETTER (OUTPATIENT)
Age: 43
End: 2023-05-08

## 2024-07-14 ENCOUNTER — HEALTH MAINTENANCE LETTER (OUTPATIENT)
Age: 44
End: 2024-07-14
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
